# Patient Record
Sex: MALE | Race: WHITE | NOT HISPANIC OR LATINO | Employment: UNEMPLOYED | URBAN - METROPOLITAN AREA
[De-identification: names, ages, dates, MRNs, and addresses within clinical notes are randomized per-mention and may not be internally consistent; named-entity substitution may affect disease eponyms.]

---
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
## 2024-03-06 ENCOUNTER — OFFICE VISIT (OUTPATIENT)
Dept: FAMILY MEDICINE | Facility: CLINIC | Age: 19
End: 2024-03-06
Payer: MEDICAID

## 2024-03-06 VITALS
BODY MASS INDEX: 20.1 KG/M2 | WEIGHT: 140.38 LBS | HEIGHT: 70 IN | DIASTOLIC BLOOD PRESSURE: 81 MMHG | OXYGEN SATURATION: 100 % | TEMPERATURE: 98 F | HEART RATE: 61 BPM | SYSTOLIC BLOOD PRESSURE: 125 MMHG

## 2024-03-06 DIAGNOSIS — Z72.0 VAPES NICOTINE CONTAINING SUBSTANCE: ICD-10-CM

## 2024-03-06 DIAGNOSIS — B07.9 VIRAL WARTS, UNSPECIFIED TYPE: Primary | ICD-10-CM

## 2024-03-06 DIAGNOSIS — Z86.19 HX OF CHLAMYDIA INFECTION: ICD-10-CM

## 2024-03-06 PROCEDURE — 99213 OFFICE O/P EST LOW 20 MIN: CPT | Mod: PBBFAC | Performed by: STUDENT IN AN ORGANIZED HEALTH CARE EDUCATION/TRAINING PROGRAM

## 2024-03-06 NOTE — PROGRESS NOTES
"[facility]  Office Visit Note    Subjective   Roberto Hall  [id number]  03/07/2024    Chief Complaint: F/U WARTS    Roberto Hall is a 18 y.o. male  presenting to [facility] for routine F/u.    Warts  - 2 small lesions remaining on left hand  - pt requests topical given last visit  - interested in injections if does not resolve    Vaping  - quit for one week  - unable to quantify current use  - interested in quitting  - does not want to use patches, lozenges or gum    Social: sexually active with same female partner over last year.       - last STD testing 1/2023 negative    Review of Systems   Constitutional:  Negative for activity change, appetite change, fatigue and fever.   Cardiovascular:  Negative for chest pain.   Gastrointestinal:  Negative for abdominal pain.   Genitourinary:  Negative for dysuria, penile discharge and penile pain.   Musculoskeletal:  Negative for arthralgias.       Objective    Vitals:    03/06/24 1003   BP: 125/81   BP Location: Right arm   Patient Position: Sitting   BP Method: Medium (Automatic)   Pulse: 61   Temp: 97.9 °F (36.6 °C)   TempSrc: Oral   SpO2: 100%   Weight: 63.7 kg (140 lb 6.4 oz)   Height: 5' 10" (1.778 m)        Physical Exam  Constitutional:       General: He is not in acute distress.     Appearance: He is not ill-appearing.   Cardiovascular:      Rate and Rhythm: Normal rate and regular rhythm.   Pulmonary:      Effort: Pulmonary effort is normal.      Breath sounds: Normal breath sounds.   Abdominal:      General: Bowel sounds are normal. There is no distension.      Palpations: Abdomen is soft.      Tenderness: There is no abdominal tenderness.   Skin:     General: Skin is warm and dry.      Comments: 2 Rough, demarcated raised lesion on 3rd digit left hand   Neurological:      General: No focal deficit present.      Mental Status: He is oriented to person, place, and time.   Psychiatric:         Mood and Affect: Mood normal.         Behavior: Behavior " normal.         Thought Content: Thought content normal.         Judgment: Judgment normal.         Current Medications:   Current Outpatient Medications   Medication Sig Dispense Refill    mupirocin (BACTROBAN) 2 % ointment Apply topically 3 (three) times daily. 30 g 0    salicylic acid 40 % PtMd Apply 0.5 application  topically once daily. 36 each 0     No current facility-administered medications for this visit.       Assessment & Plan:  1. Viral warts, unspecified type    2. Vapes nicotine containing substance    3. Hx of chlamydia infection        Orders Placed This Encounter    salicylic acid 40 % PtMd       - Topical to wart lesions. Instructed to call if would like procedure clinic referral.   - Declines repeat STD testing today. Hand out given. Precautions discussed.   - Discussed benefits of smoking cessation. Plan for small goals to decrease use. Declines medication & cessation program.     RTC in 6 months      Delia Thomas M.D.  [facility]

## 2024-03-10 NOTE — PROGRESS NOTES
Faculty addendum: Patient discussed with resident on day of encounter.  Care provided reasonable and necessary. I participated in the management of the patient and was immediately available throughout the encounter. Services were furnished in a primary care center located in the outpatient department of a [facility]. I agree with the resident's findings and plan as documented in the resident's note.     Neda Wakefield, DO

## 2024-10-10 ENCOUNTER — HOSPITAL ENCOUNTER (EMERGENCY)
Facility: HOSPITAL | Age: 19
Discharge: HOME | End: 2024-10-11
Attending: EMERGENCY MEDICINE
Payer: MEDICAID

## 2024-10-10 VITALS
WEIGHT: 145 LBS | SYSTOLIC BLOOD PRESSURE: 131 MMHG | HEIGHT: 72 IN | TEMPERATURE: 98 F | RESPIRATION RATE: 20 BRPM | HEART RATE: 60 BPM | DIASTOLIC BLOOD PRESSURE: 90 MMHG | BODY MASS INDEX: 19.64 KG/M2 | OXYGEN SATURATION: 100 %

## 2024-10-10 DIAGNOSIS — R51.9 NONINTRACTABLE HEADACHE, UNSPECIFIED CHRONICITY PATTERN, UNSPECIFIED HEADACHE TYPE: Primary | ICD-10-CM

## 2024-10-10 PROCEDURE — 63600175 PHARM REV CODE 636 W HCPCS: Performed by: EMERGENCY MEDICINE

## 2024-10-10 PROCEDURE — 99284 EMERGENCY DEPT VISIT MOD MDM: CPT | Mod: 25

## 2024-10-10 PROCEDURE — 96372 THER/PROPH/DIAG INJ SC/IM: CPT | Performed by: EMERGENCY MEDICINE

## 2024-10-10 RX ORDER — KETOROLAC TROMETHAMINE 30 MG/ML
60 INJECTION, SOLUTION INTRAMUSCULAR; INTRAVENOUS
Status: COMPLETED | OUTPATIENT
Start: 2024-10-10 | End: 2024-10-10

## 2024-10-10 RX ADMIN — KETOROLAC TROMETHAMINE 60 MG: 30 INJECTION, SOLUTION INTRAMUSCULAR at 11:10

## 2024-10-11 RX ORDER — BUTALBITAL, ACETAMINOPHEN AND CAFFEINE 50; 325; 40 MG/1; MG/1; MG/1
1 TABLET ORAL EVERY 6 HOURS PRN
Qty: 20 TABLET | Refills: 0 | Status: SHIPPED | OUTPATIENT
Start: 2024-10-11 | End: 2024-11-10

## 2024-10-11 NOTE — ED PROVIDER NOTES
Encounter Date: 10/10/2024       History     Chief Complaint   Patient presents with    Headache     Headache since 1500-took acetaminophen 20 minutes ago-eye pressure-no visual problem     19-year-old male no past medical history presents with bilateral retro-orbital headache starting around 3:00 a.m. today.  Patient describes pain as pressure and gradually getting worse.  He has had headaches similar to this however today is more severe.  No cough, sore throat, runny nose, congestion, neck pain, fever or neuro symptoms.  No vision change    The history is provided by the patient.     Review of patient's allergies indicates:   Allergen Reactions    Amoxicillin      Past Medical History:   Diagnosis Date    Bilateral headaches 1/27/2023     No past surgical history on file.  Family History   Problem Relation Name Age of Onset    Hypertension Father       Social History     Tobacco Use    Smoking status: Every Day     Types: Vaping with nicotine    Smokeless tobacco: Never   Substance Use Topics    Alcohol use: Not Currently     Comment: occassionally    Drug use: Never     Review of Systems   Constitutional:  Negative for chills, diaphoresis, fatigue and fever.   HENT:  Negative for congestion, drooling, ear discharge, ear pain, postnasal drip, rhinorrhea, sinus pressure, sinus pain, sore throat and tinnitus.    Eyes:  Negative for discharge.   Respiratory:  Negative for cough, chest tightness, shortness of breath and wheezing.    Cardiovascular:  Negative for chest pain and palpitations.   Gastrointestinal:  Negative for abdominal pain, diarrhea, nausea and vomiting.   Genitourinary:  Negative for frequency, hematuria and urgency.   Musculoskeletal:  Negative for back pain, neck pain and neck stiffness.   Skin:  Negative for rash.   Neurological:  Positive for headaches. Negative for syncope, weakness, light-headedness and numbness.   Psychiatric/Behavioral:  Negative for suicidal ideas.        Physical Exam      Initial Vitals [10/10/24 2247]   BP Pulse Resp Temp SpO2   (!) 131/90 60 20 97.7 °F (36.5 °C) 100 %      MAP       --         Physical Exam    Nursing note and vitals reviewed.  Constitutional: He appears well-nourished. No distress.   HENT:   Head: Atraumatic.   Eyes: Conjunctivae and EOM are normal. Pupils are equal, round, and reactive to light.   Neck: Neck supple.   Normal range of motion.  Cardiovascular:  Normal rate.           Pulmonary/Chest: No respiratory distress.   Musculoskeletal:      Cervical back: Normal range of motion and neck supple.     Neurological: He is alert and oriented to person, place, and time. He has normal strength. No cranial nerve deficit or sensory deficit. GCS score is 15. GCS eye subscore is 4. GCS verbal subscore is 5. GCS motor subscore is 6.   Skin: Skin is warm and dry. No rash noted.         ED Course   Procedures  Labs Reviewed - No data to display       Imaging Results    None          Medications   ketorolac injection 60 mg (60 mg Intramuscular Given 10/10/24 2324)     Medical Decision Making  Medical Decision Making  Problem list/ differential diagnosis including but not limited to:  Migraine, tension, cluster, meningitis/encephalitis, ich/sah, hydrocephalus, mass, cva, carotid/ basilar artery dissection, cerebral venous thrombosis, sinusitis, cavernous sinus thrombosis,viral illness    Patient's chronic illnesses impacting care:  none    Diagnostic test considered but not ordered:    My interpretations:      Radiology reports      Discussion of case with external qualified healthcare professionals:  Not applicable    Review of external notes( inpt, ems, NH, clinic):      Decision rules/scores:    Medications reviewed:  Tylenol  Medications ordered in the ER:  Toradol  Discharge prescriptions: fioricet    Social variables possible impacting patient's healthcare:    Code status/discussion    Shared decision making:    Consideration for admission versus discharge:  stable for discharge     Risk  Prescription drug management.               ED Course as of 10/11/24 0006   Fri Oct 11, 2024   0004 HEADACHE FEELS BETTER [WC]      ED Course User Index  [WC] Israel You MD                           Clinical Impression:  Final diagnoses:  [R51.9] Nonintractable headache, unspecified chronicity pattern, unspecified headache type (Primary)          ED Disposition Condition    Discharge Stable          ED Prescriptions       Medication Sig Dispense Start Date End Date Auth. Provider    butalbital-acetaminophen-caffeine -40 mg (FIORICET, ESGIC) -40 mg per tablet Take 1 tablet by mouth every 6 (six) hours as needed. 20 tablet 10/11/2024 11/10/2024 Israel You MD          Follow-up Information       Follow up With Specialties Details Why Contact Info    [facility] - Emergency Dept Emergency Medicine  If symptoms worsen [id number]  [facility] [address]  [phone]             Israel You MD  10/11/24 0006

## 2024-10-16 ENCOUNTER — OFFICE VISIT (OUTPATIENT)
Dept: FAMILY MEDICINE | Facility: CLINIC | Age: 19
End: 2024-10-16
Payer: MEDICAID

## 2024-10-16 VITALS
SYSTOLIC BLOOD PRESSURE: 126 MMHG | OXYGEN SATURATION: 99 % | TEMPERATURE: 98 F | HEART RATE: 80 BPM | DIASTOLIC BLOOD PRESSURE: 85 MMHG | HEIGHT: 72 IN | WEIGHT: 141.81 LBS | BODY MASS INDEX: 19.21 KG/M2

## 2024-10-16 DIAGNOSIS — L23.7 POISON IVY DERMATITIS: Primary | ICD-10-CM

## 2024-10-16 PROCEDURE — 99213 OFFICE O/P EST LOW 20 MIN: CPT | Mod: PBBFAC

## 2024-10-16 RX ORDER — TRIAMCINOLONE ACETONIDE 5 MG/G
CREAM TOPICAL 2 TIMES DAILY
Qty: 15 G | Refills: 0 | Status: SHIPPED | OUTPATIENT
Start: 2024-10-16 | End: 2024-10-30

## 2024-10-16 RX ORDER — HYDROXYZINE HYDROCHLORIDE 25 MG/1
25 TABLET, FILM COATED ORAL 3 TIMES DAILY
Qty: 42 TABLET | Refills: 0 | Status: SHIPPED | OUTPATIENT
Start: 2024-10-16 | End: 2024-10-30

## 2024-10-16 NOTE — PROGRESS NOTES
Roberto Hall  10/16/2024  [id number]    Giorgi Dong MD  Patient Care Team:  Giorgi Dong MD as PCP - General (Family Medicine)    Visit Type: Acute Visit    Chief Complaint:  Chief Complaint   Patient presents with    Poison Ivy       History of Present Illness:  Roberto Hall presents to the clinic due to a rash on bilateral forearms with insect bites. He was working out in the woods clearing some tree branches and was exposed to poison ivy 2 days ago. He has since been extremely itchy to his arms and has been applying calamine lotion with a little bit of relief. He has not been taking any oral medication at this time. He has no other concerns or complaints today.      History:  Past Medical History:   Diagnosis Date    Bilateral headaches 1/27/2023     No past surgical history on file.  Family History   Problem Relation Name Age of Onset    Hypertension Father       Social History     Socioeconomic History    Marital status: Single   Tobacco Use    Smoking status: Every Day     Types: Vaping with nicotine    Smokeless tobacco: Never   Substance and Sexual Activity    Alcohol use: Not Currently     Comment: occassionally    Drug use: Never    Sexual activity: Yes     Patient Active Problem List   Diagnosis    Vapes nicotine containing substance    Hx of chlamydia infection    Hyperbilirubinemia    Bilateral headaches     Review of patient's allergies indicates:   Allergen Reactions    Amoxicillin        The following were reviewed at this visit: active problem list, medication list, allergies, family history, social history, and health maintenance.    Medications:  Current Outpatient Medications on File Prior to Visit   Medication Sig Dispense Refill    butalbital-acetaminophen-caffeine -40 mg (FIORICET, ESGIC) -40 mg per tablet Take 1 tablet by mouth every 6 (six) hours as needed. 20 tablet 0    mupirocin (BACTROBAN) 2 % ointment Apply topically 3 (three) times daily. 30 g 0    salicylic acid 40 % PtMd  Apply 0.5 application  topically once daily. 36 each 0     No current facility-administered medications on file prior to visit.       Medications have been reviewed and reconciled with patient at this visit.  Barriers to medications reviewed with patient.  Adverse reactions to current medications reviewed with patient.  Over the counter medications reviewed and reconciled with patient.    Exam:  /85 (BP Location: Right arm, Patient Position: Sitting)   Pulse 80   Temp 98.2 °F (36.8 °C) (Oral)   Ht 6' (1.829 m)   Wt 64.3 kg (141 lb 12.8 oz)   SpO2 99%   BMI 19.23 kg/m²     Physical Exam:  Constitutional: AOx3, Normal appearance.   HENT: Normocephalic and atraumatic, EMOI   MSK: Normal ROM  Skin: warm and dry. Pruritic rash with a few pinpoint vesicles to the bilateral forearms   Neuro: No focal deficit present.   Psych: Mood and behavior normal. '      Assessment and Plan:  Roberto was seen today for poison ivy.  Diagnoses and all orders for this visit:    Poison ivy dermatitis  -Discussed with patient if exposed to poison ivy in the future that he should take a shower immediately using christy soap and to clean any clothing that may have been exposed as well. The rash will not spread after a shower but areas that initially cam in contact with the plant will continue to surface over the first 48hrs. Although there may be new areas of the rash it is not spreading, it is the initial exposed area reacting.   -Will begin the patient on triamcinolone and hydroxyzine for 2 weeks. Discussed that his skin may dry out with the triamcinolone and he should use an emollient like Aquaphor to the area.  -triamcinolone acetonide 0.5% (KENALOG) 0.5 % Crea; Apply topically 2 (two) times daily. for 14 days  -hydrOXYzine HCL (ATARAX) 25 MG tablet; Take 1 tablet (25 mg total) by mouth 3 (three) times daily. for 14 days    Follow up: PRN    After visit summary was printed and given to patient upon discharge today.  Patient goals  and care plan are included in After Visit Summary.        This note is dictated using the M*Modal Fluency Direct word recognition program. There are word recognition mistakes that are occasionally missed on review.     Emilee De La Cruz MD  Family Medicine

## 2024-10-17 ENCOUNTER — OFFICE VISIT (OUTPATIENT)
Dept: URGENT CARE | Facility: CLINIC | Age: 19
End: 2024-10-17
Payer: MEDICAID

## 2024-10-17 VITALS
WEIGHT: 143.81 LBS | TEMPERATURE: 98 F | OXYGEN SATURATION: 100 % | SYSTOLIC BLOOD PRESSURE: 122 MMHG | BODY MASS INDEX: 19.48 KG/M2 | HEIGHT: 72 IN | RESPIRATION RATE: 18 BRPM | HEART RATE: 60 BPM | DIASTOLIC BLOOD PRESSURE: 82 MMHG

## 2024-10-17 DIAGNOSIS — L30.9 DERMATITIS: Primary | ICD-10-CM

## 2024-10-17 PROCEDURE — 99213 OFFICE O/P EST LOW 20 MIN: CPT | Mod: S$PBB,,, | Performed by: NURSE PRACTITIONER

## 2024-10-17 PROCEDURE — 99214 OFFICE O/P EST MOD 30 MIN: CPT | Mod: PBBFAC | Performed by: NURSE PRACTITIONER

## 2024-10-17 RX ORDER — PREDNISONE 20 MG/1
20 TABLET ORAL 2 TIMES DAILY
Qty: 10 TABLET | Refills: 0 | Status: SHIPPED | OUTPATIENT
Start: 2024-10-17 | End: 2024-10-22

## 2024-10-17 NOTE — PROGRESS NOTES
Faculty Attestation: Roberto Hall  was seen in Family Medicine Clinic. Discussed with Dr. De La Cruz at the time of the visit. History of Present Illness, Physical Exam, and Assessment and Plan reviewed. Treatment plan is reasonable and appropriate. Compliance with treatment recommendations is important.       Hilda Rodríguez MD  Family Medicine

## 2024-10-17 NOTE — PATIENT INSTRUCTIONS
Please follow instructions on patient education material.      Return to urgent care in 2 to 3 days if symptoms are not improving, immediately if you develop any new or worsening symptoms.     See patient education for further guidance.  Continue calamine  Take over-the-counter antihistamine of your choice such as Claritin Zyrtec or Xyzal  Do not scratch your hands with your fingernails, they can become infected.  Start Rx meds today.  Drink plenty of fluids dehydrated  RTC for any worsening.  Advised no hydrogen peroxide, alcohol, or kitchen items on the rash.     Go to the ER if you experience chest pain with shortness of breath, shortness of breath when moving around your house, high fevers 103.0+, excessive vomiting/diarrhea, or general distress.

## 2024-10-17 NOTE — PROGRESS NOTES
Subjective:      Patient ID: Roberto Hall is a 19 y.o. male.    Vitals:  height is 6' (1.829 m) and weight is 65.2 kg (143 lb 12.8 oz). His oral temperature is 98.1 °F (36.7 °C). His blood pressure is 122/82 and his pulse is 60. His respiration is 18 and oxygen saturation is 100%.     Chief Complaint: Poison Ivy (Patient reports poison ivy all over body x 3 days Pt was prescribed Atarax and Kenalog cream at PCP yesterday but states symptoms haven't gotten better and woke up this morning with tightness in face )    Poison Idania     As stated in CC. Pt reports he took hydroxyzine as used for itching in poison ivy dermatitis.  Patient states he woke up drowsy and feeling tired as described tightness in his face. Pt reports using cream last night as well. PT reports calamine lotion works better. Patient denies headache, dizziness, weakness, trouble breathing, shortness for breath sore throat, trouble swallowing, chest pain, stomach pain, nausea or vomiting.  ROS   Objective:     Physical Exam   Constitutional: He is oriented to person, place, and time. He appears well-developed.  Non-toxic appearance. He does not appear ill. No distress.   HENT:   Head: Atraumatic.   Nose: No purulent discharge. Right sinus exhibits no maxillary sinus tenderness and no frontal sinus tenderness. Left sinus exhibits no maxillary sinus tenderness and no frontal sinus tenderness.   Mouth/Throat: Uvula is midline.   Eyes: Right eye exhibits no discharge. Left eye exhibits no discharge. Extraocular movement intact   Neck: Neck supple. No neck rigidity present.   Cardiovascular: Regular rhythm.   Pulmonary/Chest: Effort normal and breath sounds normal. No respiratory distress. He has no wheezes. He has no rhonchi. He has no rales.   Musculoskeletal: Normal range of motion.         General: Normal range of motion.   Lymphadenopathy:     He has no cervical adenopathy.   Neurological: He is alert and oriented to person, place, and time.   Skin: Skin  is warm, dry, not diaphoretic and rash. Capillary refill takes less than 2 seconds.         Comments: Rash to generalized body consistent with allergic dermatitis   Psychiatric: His behavior is normal. Mood, judgment and thought content normal.   Nursing note and vitals reviewed.      Assessment:     1. Dermatitis        Plan:   Prescription management with steroids prednisone 20 mg p.o. b.i.d. for 5 days to help with inflammation and rash  Continue calamine  Take over-the-counter antihistamine of your choice such as Claritin Zyrtec or Xyzal  Do not scratch your hands with your fingernails, they can become infected.  Start Rx meds today.  Drink plenty of fluids dehydrated  RTC for any worsening.  Advised no hydrogen peroxide, alcohol, or kitchen items on the anthony  Dermatitis    Other orders  -     predniSONE (DELTASONE) 20 MG tablet; Take 1 tablet (20 mg total) by mouth 2 (two) times daily. for 5 days  Dispense: 10 tablet; Refill: 0

## 2024-10-17 NOTE — LETTER
October 17, 2024      [facility]  [facility] [facility] [address]  Phone: [phone]       Patient: Roberto Hall   YOB: 2005  Date of Visit: 10/17/2024    To Whom It May Concern:    Tawana Hall  was at [facility] on 10/17/2024. The patient may return to work/school on 10/19/2024 with no restrictions. If you have any questions or concerns, or if I can be of further assistance, please do not hesitate to contact me.    Sincerely,    JJ Cabrera

## 2024-10-28 ENCOUNTER — ON-DEMAND VIRTUAL (OUTPATIENT)
Dept: URGENT CARE | Facility: CLINIC | Age: 19
End: 2024-10-28
Payer: MEDICAID

## 2024-10-28 DIAGNOSIS — R30.0 DYSURIA: Primary | ICD-10-CM

## 2024-10-28 DIAGNOSIS — R35.0 MICTURITION FREQUENCY: ICD-10-CM

## 2024-10-28 PROCEDURE — 99284 EMERGENCY DEPT VISIT MOD MDM: CPT

## 2024-10-28 PROCEDURE — 99212 OFFICE O/P EST SF 10 MIN: CPT | Mod: 95,,, | Performed by: FAMILY MEDICINE

## 2024-10-28 RX ORDER — CIPROFLOXACIN 500 MG/1
500 TABLET ORAL EVERY 12 HOURS
Qty: 14 TABLET | Refills: 0 | Status: SHIPPED | OUTPATIENT
Start: 2024-10-28 | End: 2024-11-04

## 2024-10-28 RX ORDER — PHENAZOPYRIDINE HYDROCHLORIDE 200 MG/1
200 TABLET, FILM COATED ORAL 3 TIMES DAILY PRN
Qty: 6 TABLET | Refills: 0 | Status: SHIPPED | OUTPATIENT
Start: 2024-10-28 | End: 2024-10-30

## 2024-10-29 ENCOUNTER — ON-DEMAND VIRTUAL (OUTPATIENT)
Dept: URGENT CARE | Facility: CLINIC | Age: 19
End: 2024-10-29
Payer: MEDICAID

## 2024-10-29 ENCOUNTER — HOSPITAL ENCOUNTER (EMERGENCY)
Facility: HOSPITAL | Age: 19
Discharge: HOME | End: 2024-10-29
Attending: EMERGENCY MEDICINE
Payer: MEDICAID

## 2024-10-29 ENCOUNTER — NURSE TRIAGE (OUTPATIENT)
Dept: ADMINISTRATIVE | Facility: CLINIC | Age: 19
End: 2024-10-29
Payer: MEDICAID

## 2024-10-29 VITALS
HEART RATE: 63 BPM | RESPIRATION RATE: 16 BRPM | BODY MASS INDEX: 19.98 KG/M2 | OXYGEN SATURATION: 100 % | WEIGHT: 142.75 LBS | SYSTOLIC BLOOD PRESSURE: 132 MMHG | TEMPERATURE: 98 F | DIASTOLIC BLOOD PRESSURE: 78 MMHG | HEIGHT: 71 IN

## 2024-10-29 DIAGNOSIS — Z20.2 STD EXPOSURE: Primary | ICD-10-CM

## 2024-10-29 DIAGNOSIS — N30.00 ACUTE CYSTITIS WITHOUT HEMATURIA: ICD-10-CM

## 2024-10-29 DIAGNOSIS — K59.00 CONSTIPATION, UNSPECIFIED CONSTIPATION TYPE: Primary | ICD-10-CM

## 2024-10-29 DIAGNOSIS — R10.9 ABDOMINAL PAIN: ICD-10-CM

## 2024-10-29 LAB
ALBUMIN SERPL-MCNC: 4.6 G/DL (ref 3.5–5)
ALBUMIN/GLOB SERPL: 1.4 RATIO (ref 1.1–2)
ALP SERPL-CCNC: 64 UNIT/L (ref 40–150)
ALT SERPL-CCNC: 12 UNIT/L (ref 0–55)
ANION GAP SERPL CALC-SCNC: 11 MEQ/L
AST SERPL-CCNC: 12 UNIT/L (ref 5–34)
BACTERIA #/AREA URNS AUTO: ABNORMAL /HPF
BASOPHILS # BLD AUTO: 0.05 X10(3)/MCL
BASOPHILS NFR BLD AUTO: 0.7 %
BILIRUB SERPL-MCNC: 1.1 MG/DL
BILIRUB UR QL STRIP.AUTO: NEGATIVE
BUN SERPL-MCNC: 12.7 MG/DL (ref 8.9–20.6)
CALCIUM SERPL-MCNC: 9.7 MG/DL (ref 8.4–10.2)
CHLORIDE SERPL-SCNC: 102 MMOL/L (ref 98–107)
CLARITY UR: CLEAR
CO2 SERPL-SCNC: 27 MMOL/L (ref 22–29)
COLOR UR AUTO: ABNORMAL
CREAT SERPL-MCNC: 0.88 MG/DL (ref 0.72–1.25)
CREAT/UREA NIT SERPL: 14
EOSINOPHIL # BLD AUTO: 0.16 X10(3)/MCL (ref 0–0.9)
EOSINOPHIL NFR BLD AUTO: 2.2 %
ERYTHROCYTE [DISTWIDTH] IN BLOOD BY AUTOMATED COUNT: 12.4 % (ref 11.5–17)
GFR SERPLBLD CREATININE-BSD FMLA CKD-EPI: >60 ML/MIN/1.73/M2
GLOBULIN SER-MCNC: 3.3 GM/DL (ref 2.4–3.5)
GLUCOSE SERPL-MCNC: 105 MG/DL (ref 74–100)
GLUCOSE UR QL STRIP: NORMAL
HCT VFR BLD AUTO: 47.2 % (ref 42–52)
HGB BLD-MCNC: 16.3 G/DL (ref 14–18)
HGB UR QL STRIP: NEGATIVE
HYALINE CASTS #/AREA URNS LPF: ABNORMAL /LPF
IMM GRANULOCYTES # BLD AUTO: 0.02 X10(3)/MCL (ref 0–0.04)
IMM GRANULOCYTES NFR BLD AUTO: 0.3 %
KETONES UR QL STRIP: NEGATIVE
LEUKOCYTE ESTERASE UR QL STRIP: 25
LIPASE SERPL-CCNC: 21 U/L
LYMPHOCYTES # BLD AUTO: 2.61 X10(3)/MCL (ref 0.6–4.6)
LYMPHOCYTES NFR BLD AUTO: 36.2 %
MCH RBC QN AUTO: 29.9 PG (ref 27–31)
MCHC RBC AUTO-ENTMCNC: 34.5 G/DL (ref 33–36)
MCV RBC AUTO: 86.6 FL (ref 80–94)
MONOCYTES # BLD AUTO: 0.68 X10(3)/MCL (ref 0.1–1.3)
MONOCYTES NFR BLD AUTO: 9.4 %
NEUTROPHILS # BLD AUTO: 3.69 X10(3)/MCL (ref 2.1–9.2)
NEUTROPHILS NFR BLD AUTO: 51.2 %
NITRITE UR QL STRIP: NEGATIVE
NRBC BLD AUTO-RTO: 0 %
PH UR STRIP: 6.5 [PH]
PLATELET # BLD AUTO: 216 X10(3)/MCL (ref 130–400)
PMV BLD AUTO: 9.7 FL (ref 7.4–10.4)
POTASSIUM SERPL-SCNC: 3.8 MMOL/L (ref 3.5–5.1)
PROT SERPL-MCNC: 7.9 GM/DL (ref 6.4–8.3)
PROT UR QL STRIP: NEGATIVE
RBC # BLD AUTO: 5.45 X10(6)/MCL (ref 4.7–6.1)
RBC #/AREA URNS AUTO: ABNORMAL /HPF
SODIUM SERPL-SCNC: 140 MMOL/L (ref 136–145)
SP GR UR STRIP.AUTO: <1.005 (ref 1–1.03)
SQUAMOUS #/AREA URNS LPF: ABNORMAL /HPF
UROBILINOGEN UR STRIP-ACNC: NORMAL
WBC # BLD AUTO: 7.21 X10(3)/MCL (ref 4.5–11.5)
WBC #/AREA URNS AUTO: ABNORMAL /HPF

## 2024-10-29 PROCEDURE — 83690 ASSAY OF LIPASE: CPT | Performed by: NURSE PRACTITIONER

## 2024-10-29 PROCEDURE — 85025 COMPLETE CBC W/AUTO DIFF WBC: CPT | Performed by: NURSE PRACTITIONER

## 2024-10-29 PROCEDURE — 81001 URINALYSIS AUTO W/SCOPE: CPT | Performed by: NURSE PRACTITIONER

## 2024-10-29 PROCEDURE — 87086 URINE CULTURE/COLONY COUNT: CPT | Performed by: NURSE PRACTITIONER

## 2024-10-29 PROCEDURE — 80053 COMPREHEN METABOLIC PANEL: CPT | Performed by: NURSE PRACTITIONER

## 2024-10-29 RX ORDER — POLYETHYLENE GLYCOL 3350 17 G/17G
17 POWDER, FOR SOLUTION ORAL DAILY PRN
Qty: 10 EACH | Refills: 0 | Status: SHIPPED | OUTPATIENT
Start: 2024-10-29

## 2024-10-29 RX ORDER — DOXYCYCLINE 100 MG/1
100 CAPSULE ORAL 2 TIMES DAILY
Qty: 14 CAPSULE | Refills: 0 | Status: SHIPPED | OUTPATIENT
Start: 2024-10-29 | End: 2024-11-05

## 2024-10-31 LAB — BACTERIA UR CULT: NO GROWTH

## 2024-11-15 ENCOUNTER — OFFICE VISIT (OUTPATIENT)
Dept: FAMILY MEDICINE | Facility: CLINIC | Age: 19
End: 2024-11-15
Payer: MEDICAID

## 2024-11-15 VITALS
OXYGEN SATURATION: 100 % | TEMPERATURE: 98 F | HEART RATE: 82 BPM | DIASTOLIC BLOOD PRESSURE: 78 MMHG | SYSTOLIC BLOOD PRESSURE: 121 MMHG | WEIGHT: 139.63 LBS | BODY MASS INDEX: 19.55 KG/M2 | HEIGHT: 71 IN

## 2024-11-15 DIAGNOSIS — Z71.1 CONCERN ABOUT STD IN MALE WITHOUT DIAGNOSIS: Primary | ICD-10-CM

## 2024-11-15 LAB
BACTERIA #/AREA URNS AUTO: ABNORMAL /HPF
BILIRUB UR QL STRIP.AUTO: NEGATIVE
C TRACH DNA SPEC QL NAA+PROBE: NOT DETECTED
CLARITY UR: CLEAR
COLOR UR AUTO: COLORLESS
GLUCOSE UR QL STRIP: NORMAL
HGB UR QL STRIP: NEGATIVE
HYALINE CASTS #/AREA URNS LPF: ABNORMAL /LPF
KETONES UR QL STRIP: NEGATIVE
LEUKOCYTE ESTERASE UR QL STRIP: NEGATIVE
N GONORRHOEA DNA SPEC QL NAA+PROBE: NOT DETECTED
NITRITE UR QL STRIP: NEGATIVE
PH UR STRIP: 7 [PH]
PROT UR QL STRIP: NEGATIVE
RBC #/AREA URNS AUTO: ABNORMAL /HPF
SOURCE (OHS): NORMAL
SP GR UR STRIP.AUTO: 1.01 (ref 1–1.03)
SQUAMOUS #/AREA URNS LPF: ABNORMAL /HPF
UROBILINOGEN UR STRIP-ACNC: NORMAL
WBC #/AREA URNS AUTO: ABNORMAL /HPF

## 2024-11-15 PROCEDURE — 81001 URINALYSIS AUTO W/SCOPE: CPT

## 2024-11-15 PROCEDURE — 87591 N.GONORRHOEAE DNA AMP PROB: CPT

## 2024-11-15 PROCEDURE — 99213 OFFICE O/P EST LOW 20 MIN: CPT | Mod: PBBFAC

## 2024-11-15 RX ORDER — DOXYCYCLINE 100 MG/1
100 CAPSULE ORAL 2 TIMES DAILY
Qty: 14 CAPSULE | Refills: 0 | Status: SHIPPED | OUTPATIENT
Start: 2024-11-15 | End: 2024-11-22

## 2024-11-15 NOTE — PROGRESS NOTES
"[facility] OFFICE VISIT NOTE  Roberto Hall  [id number]  11/15/2024    Chief Complaint   Patient presents with    STD TESTING       Roberto Hall is a 19 y.o. male  presenting to [facility] for STD testing.    Treated for chlamydia approx 2 weeks ago, completed treatment on Tuesday 11/5/2024 with  7 day course of doxycycline. States had intercourse on that day and that partner then tested positive for chlamydia. Currently still having AM discharge. Denies any burning/pain with urination.    (As documented by patient prior to office visit)  Review of Systems   Constitutional:  Negative for activity change and unexpected weight change.   HENT:  Negative for hearing loss, rhinorrhea and trouble swallowing.    Eyes:  Negative for discharge and visual disturbance.   Respiratory:  Negative for chest tightness and wheezing.    Cardiovascular:  Negative for chest pain and palpitations.   Gastrointestinal:  Negative for blood in stool, constipation, diarrhea and vomiting.   Endocrine: Negative for polydipsia and polyuria.   Genitourinary:  Positive for penile discharge. Negative for difficulty urinating, hematuria, penile pain and urgency.   Musculoskeletal:  Negative for arthralgias, joint swelling and neck pain.   Neurological:  Negative for weakness and headaches.   Psychiatric/Behavioral:  Negative for confusion and dysphoric mood.        Blood pressure 121/78, pulse 82, temperature 98.1 °F (36.7 °C), temperature source Oral, height 5' 11" (1.803 m), weight 63.3 kg (139 lb 9.6 oz), SpO2 100%.   Physical Exam  Vitals and nursing note reviewed.   Constitutional:       General: He is not in acute distress.     Appearance: He is not ill-appearing, toxic-appearing or diaphoretic.   Eyes:      Conjunctiva/sclera: Conjunctivae normal.   Cardiovascular:      Rate and Rhythm: Normal rate and regular rhythm.      Heart sounds: Normal heart sounds. Heart sounds not distant. No murmur heard.     No friction rub. No gallop.   Pulmonary:      " Effort: Pulmonary effort is normal. No respiratory distress.      Breath sounds: Normal breath sounds. No decreased breath sounds or wheezing.   Abdominal:      General: Abdomen is flat. Bowel sounds are normal. There is no distension.      Palpations: Abdomen is soft.      Tenderness: There is no abdominal tenderness. There is no guarding.   Genitourinary:     Comments: Deferred  Skin:     General: Skin is warm.      Capillary Refill: Capillary refill takes less than 2 seconds.   Neurological:      Mental Status: He is alert.         Current Medications:   Current Outpatient Medications   Medication Instructions    doxycycline (VIBRAMYCIN) 100 mg, Oral, 2 times daily    polyethylene glycol (GLYCOLAX) 17 g, Oral, Daily PRN    salicylic acid 40 % PtMd 0.5 application , Topical (Top), Daily       Assessment:   1. Concern about STD in male without diagnosis        Plan:  STD  - Patient previously tested positive for chlamydia, treated, and then had unprotected intercourse  - UA and urine GC/chlamydia  - Offered  exam, patient defers at this time  - Offered HIV, RPR, Hep testing; patient does not desire at this time but will call and schedule visit if he decides he wants testing      Orders Placed This Encounter    Chlamydia/GC, PCR    Urinalysis, Reflex to Urine Culture    doxycycline (VIBRAMYCIN) 100 MG Cap       Return to clinic in 3 months for DELVIN for chlamydia, or sooner if needed.     Jose Alfredo Red MD  [facility]

## 2024-11-15 NOTE — ADDENDUM NOTE
Addended by: OLGA MCPHERSON on: 11/15/2024 10:01 AM     Modules accepted: Level of Service     Known

## 2024-11-18 ENCOUNTER — OFFICE VISIT (OUTPATIENT)
Dept: FAMILY MEDICINE | Facility: CLINIC | Age: 19
End: 2024-11-18
Payer: MEDICAID

## 2024-11-18 VITALS
DIASTOLIC BLOOD PRESSURE: 73 MMHG | TEMPERATURE: 98 F | BODY MASS INDEX: 19.46 KG/M2 | RESPIRATION RATE: 20 BRPM | OXYGEN SATURATION: 99 % | SYSTOLIC BLOOD PRESSURE: 118 MMHG | HEART RATE: 68 BPM | WEIGHT: 139 LBS | HEIGHT: 71 IN

## 2024-11-18 DIAGNOSIS — Z11.3 SCREENING EXAMINATION FOR STD (SEXUALLY TRANSMITTED DISEASE): Primary | ICD-10-CM

## 2024-11-18 LAB
HAV IGM SERPL QL IA: NONREACTIVE
HBV CORE IGM SERPL QL IA: NONREACTIVE
HBV SURFACE AG SERPL QL IA: NONREACTIVE
HCV AB SERPL QL IA: NONREACTIVE
HIV 1+2 AB+HIV1 P24 AG SERPL QL IA: NONREACTIVE
T PALLIDUM AB SER QL: NONREACTIVE

## 2024-11-18 PROCEDURE — 36415 COLL VENOUS BLD VENIPUNCTURE: CPT

## 2024-11-18 PROCEDURE — 87389 HIV-1 AG W/HIV-1&-2 AB AG IA: CPT

## 2024-11-18 PROCEDURE — 80074 ACUTE HEPATITIS PANEL: CPT

## 2024-11-18 PROCEDURE — 86780 TREPONEMA PALLIDUM: CPT

## 2024-11-18 PROCEDURE — 99213 OFFICE O/P EST LOW 20 MIN: CPT | Mod: PBBFAC

## 2024-11-18 NOTE — PROGRESS NOTES
"[facility] Clinic Note    CHIEF COMPLAINT:  Chief Complaint   Patient presents with    Exposure to STD     Patient wants to be tested further    Penile Discharge       HISTORY OF  PRESENT ILLNESS:  Roberto Hall is a 19 y.o. male who presents to clinic today for STD screening.    Last seen on 11/14/2024.  Urine GC/chlamydia and urinalysis done at that visit were unremarkable. STD blood work screening was offered to him , however he declined.  He is here today to get blood work done.  Patient reports minimal clear penile discharge, and states that this has improved from when symptoms initially appeared 2 weeks ago.  He denies any fever, chills, burning with urination, increased urinary frequency or urgency.        Current Outpatient Medications   Medication Instructions    doxycycline (VIBRAMYCIN) 100 mg, Oral, 2 times daily    polyethylene glycol (GLYCOLAX) 17 g, Oral, Daily PRN    salicylic acid 40 % PtMd 0.5 application , Topical (Top), Daily         PHYSICAL EXAMINATION:  Vitals:    11/18/24 1543   BP: 118/73   BP Location: Right arm   Patient Position: Sitting   Pulse: 68   Resp: 20   Temp: 98 °F (36.7 °C)   TempSrc: Oral   SpO2: 99%   Weight: 63 kg (139 lb)   Height: 5' 11" (1.803 m)       General:  VSS. No acute distress.   Respiratory: clear to ascultation in all lung fields  Cardiovascular:  RRR, no additional heart sounds heard.  : Deferred  Musculoskeletal:  Full range of motion of all extremities  Neurologic:  A&O X 3.         ASSESSMENT/PLAN:    1) Encounter for STD screening  HIV, RPR and hepatitis panel ordered today. Will follow up results.      Keep current scheduled appointment with PCP.      Sybil Hamilton M.D  [facility] Family Medicine Resident, [facility]       "

## 2024-11-19 ENCOUNTER — TELEPHONE (OUTPATIENT)
Dept: FAMILY MEDICINE | Facility: CLINIC | Age: 19
End: 2024-11-19
Payer: MEDICAID

## 2024-11-19 DIAGNOSIS — R30.0 DYSURIA: Primary | ICD-10-CM

## 2024-11-19 NOTE — PROGRESS NOTES
I have discussed the case with the resident and reviewed the resident's history and physical, assessment, plan, and progress note. I agree with the findings.       Isiah De Leon MD  [facility]

## 2024-11-19 NOTE — TELEPHONE ENCOUNTER
Contacted patient to notify of negative lab results. He voiced understanding.      Sybil Hamilton MD  [facility] Family Medicine Resident, [facility]

## 2024-11-21 NOTE — TELEPHONE ENCOUNTER
Patient called clinic requesting a call back. He reports having slight burning with urination that began this morning. He denies any penile discharge. STD screening done at last visits were all negative. Will order a U/A with reflex to culture.        Sybil Hamilton MD  [facility] Family Medicine Resident, [facility]

## 2024-11-25 ENCOUNTER — LAB VISIT (OUTPATIENT)
Dept: LAB | Facility: HOSPITAL | Age: 19
End: 2024-11-25
Payer: MEDICAID

## 2024-11-25 DIAGNOSIS — R30.0 DYSURIA: ICD-10-CM

## 2024-11-25 LAB
BACTERIA #/AREA URNS AUTO: ABNORMAL /HPF
BILIRUB UR QL STRIP.AUTO: NEGATIVE
CLARITY UR: CLEAR
COLOR UR AUTO: ABNORMAL
GLUCOSE UR QL STRIP: NORMAL
HGB UR QL STRIP: NEGATIVE
HYALINE CASTS #/AREA URNS LPF: ABNORMAL /LPF
KETONES UR QL STRIP: NEGATIVE
LEUKOCYTE ESTERASE UR QL STRIP: NEGATIVE
MUCOUS THREADS URNS QL MICRO: ABNORMAL /LPF
NITRITE UR QL STRIP: NEGATIVE
PH UR STRIP: 6.5 [PH]
PROT UR QL STRIP: NEGATIVE
RBC #/AREA URNS AUTO: ABNORMAL /HPF
SP GR UR STRIP.AUTO: 1.02 (ref 1–1.03)
SQUAMOUS #/AREA URNS LPF: ABNORMAL /HPF
UROBILINOGEN UR STRIP-ACNC: ABNORMAL
WBC #/AREA URNS AUTO: ABNORMAL /HPF

## 2024-11-25 PROCEDURE — 81001 URINALYSIS AUTO W/SCOPE: CPT

## 2024-11-26 ENCOUNTER — PATIENT MESSAGE (OUTPATIENT)
Dept: FAMILY MEDICINE | Facility: CLINIC | Age: 19
End: 2024-11-26
Payer: MEDICAID

## 2024-11-27 ENCOUNTER — OFFICE VISIT (OUTPATIENT)
Dept: FAMILY MEDICINE | Facility: CLINIC | Age: 19
End: 2024-11-27
Payer: MEDICAID

## 2024-11-27 VITALS
HEIGHT: 71 IN | OXYGEN SATURATION: 100 % | DIASTOLIC BLOOD PRESSURE: 87 MMHG | WEIGHT: 143 LBS | TEMPERATURE: 98 F | HEART RATE: 78 BPM | BODY MASS INDEX: 20.02 KG/M2 | SYSTOLIC BLOOD PRESSURE: 130 MMHG

## 2024-11-27 DIAGNOSIS — R30.0 DYSURIA: Primary | ICD-10-CM

## 2024-11-27 LAB
BACTERIA #/AREA URNS AUTO: NORMAL /HPF
BILIRUB UR QL STRIP.AUTO: NEGATIVE
C TRACH DNA SPEC QL NAA+PROBE: DETECTED
CLARITY UR: CLEAR
COLOR UR AUTO: NORMAL
GLUCOSE UR QL STRIP: NORMAL
HGB UR QL STRIP: NEGATIVE
HYALINE CASTS #/AREA URNS LPF: NORMAL /LPF
KETONES UR QL STRIP: NEGATIVE
LEUKOCYTE ESTERASE UR QL STRIP: NEGATIVE
N GONORRHOEA DNA SPEC QL NAA+PROBE: NOT DETECTED
NITRITE UR QL STRIP: NEGATIVE
PH UR STRIP: 6.5 [PH]
PROT UR QL STRIP: NEGATIVE
RBC #/AREA URNS AUTO: NORMAL /HPF
SOURCE (OHS): ABNORMAL
SP GR UR STRIP.AUTO: 1.02 (ref 1–1.03)
SQUAMOUS #/AREA URNS LPF: NORMAL /HPF
UROBILINOGEN UR STRIP-ACNC: NORMAL
WBC #/AREA URNS AUTO: NORMAL /HPF

## 2024-11-27 PROCEDURE — 81001 URINALYSIS AUTO W/SCOPE: CPT

## 2024-11-27 PROCEDURE — 99213 OFFICE O/P EST LOW 20 MIN: CPT | Mod: PBBFAC

## 2024-11-27 PROCEDURE — 96372 THER/PROPH/DIAG INJ SC/IM: CPT | Mod: PBBFAC

## 2024-11-27 PROCEDURE — 87591 N.GONORRHOEAE DNA AMP PROB: CPT

## 2024-11-27 RX ORDER — LIDOCAINE HYDROCHLORIDE 10 MG/ML
2.1 INJECTION, SOLUTION EPIDURAL; INFILTRATION; INTRACAUDAL; PERINEURAL
Status: COMPLETED | OUTPATIENT
Start: 2024-11-27 | End: 2024-11-27

## 2024-11-27 RX ORDER — AZITHROMYCIN 1 G/1
1 POWDER, FOR SUSPENSION ORAL ONCE
Qty: 1 PACKET | Refills: 0 | Status: SHIPPED | OUTPATIENT
Start: 2024-11-27 | End: 2024-11-27

## 2024-11-27 RX ORDER — CEFTRIAXONE 500 MG/1
500 INJECTION, POWDER, FOR SOLUTION INTRAMUSCULAR; INTRAVENOUS
Status: COMPLETED | OUTPATIENT
Start: 2024-11-27 | End: 2024-11-27

## 2024-11-27 RX ADMIN — CEFTRIAXONE SODIUM 500 MG: 500 INJECTION, POWDER, FOR SOLUTION INTRAMUSCULAR; INTRAVENOUS at 09:11

## 2024-11-27 RX ADMIN — LIDOCAINE HYDROCHLORIDE 21 MG: 10 INJECTION, SOLUTION EPIDURAL; INFILTRATION; INTRACAUDAL; PERINEURAL at 09:11

## 2024-11-27 NOTE — PROGRESS NOTES
[facility] Clinic Progress Note    ID:  Roberto Hall   MRN:  [id number]     11/27/2024    Chief Complaint:    Chief Complaint   Patient presents with    STD F/U      History of Present Illness:  Roberto Hall is a 19 y.o. male who presents to [facility] clinic for STD follow up. Patient was seen previously x2. Last seen on 11/18/24 where STD workup was done after his gf was diagnosed with chlamydia. Patient was seen in clinic on 11/15 and treated for chlamydia but symptoms persisted and presented again on 11/18 where he was prescribed another course of abx with Doxycycline.     Patient now presents because he reports still having some symptoms. His gf has been treated and has no symptoms, but he is still reporting some intermittent burning along with cloudy penile discharge he notices in the morning. Reports that his discharge is very minimal and only present in the morning but is still worried. Reports no recent sexual contact with his gf and no sexual relations with anyone outside of his gf.    Medication List with Changes/Refills   New Medications    AZITHROMYCIN (ZITHROMAX) 1 GRAM PACK    Take 1 g by mouth once. for 1 dose   Current Medications    POLYETHYLENE GLYCOL (GLYCOLAX) 17 GRAM PWPK    Take 17 g by mouth daily as needed (constipation).    SALICYLIC ACID 40 % PTMD    Apply 0.5 application  topically once daily.       Review of Systems:  Pertinent positives and negatives as mentioned in HPI    Objective:    Vitals:    11/27/24 0901   BP: 130/87   Pulse: 78   Temp: 97.7 °F (36.5 °C)       Physical Exam  Constitutional:       General: He is not in acute distress.     Appearance: Normal appearance. He is not ill-appearing.   HENT:      Head: Normocephalic and atraumatic.   Eyes:      General: No scleral icterus.  Genitourinary:     Penis: Normal.       Testes: Normal.      Comments: No penile discharge on examination, no erythema or edema  Musculoskeletal:         General: Normal range of motion.      Cervical back:  Normal range of motion and neck supple.   Skin:     General: Skin is warm.      Coloration: Skin is not jaundiced.   Neurological:      Mental Status: He is alert.             Assessment/Plan:  Roberto was seen today for std f/u .    Diagnoses and all orders for this visit:    Dysuria  -     Urinalysis, Reflex to Urine Culture; Future  -     Chlamydia/GC, PCR  -     Urinalysis, Reflex to Urine Culture  -     cefTRIAXone injection 500 mg  -     LIDOcaine (PF) 10 mg/ml (1%) injection 21 mg    Other orders  -     azithromycin (ZITHROMAX) 1 gram Pack; Take 1 g by mouth once. for 1 dose        Health Maintenance   Topic Date Due    TETANUS VACCINE  04/18/2033    Hepatitis C Screening  Completed    Lipid Panel  Completed    HPV Vaccines  Completed     20 yo M presenting for STD follow up with recurrent symptoms    - Patient treated previously with x2 courses of doxycycline for presumed chlamydia given gf was diagnosed with chlamydia  - Will administer Rocephin 500mg shot mixed with 2.1ml of lidocaine  - Azithromycin 1g once sent to pharmacy on file  - UA and reflex to culture along with GC/Chlamydia swab sent off today  - Discussed with patient to call clinic if symptoms persists and may consider urology referral in the future    No follow-ups on file.    Future Appointments   Date Time Provider Department Center   3/11/2025  8:40 AM Giorgi Dong MD [facility]     Tam Campos MD  [facility], -[facility]

## 2024-11-28 ENCOUNTER — TELEPHONE (OUTPATIENT)
Dept: EMERGENCY MEDICINE | Facility: HOSPITAL | Age: 19
End: 2024-11-28
Payer: MEDICAID

## 2024-11-28 NOTE — TELEPHONE ENCOUNTER
Called patient at phone number in chart. Patient identity verified with . Discussed recent test results including chlamydia swab. Patient received Rocephin in clinic and azithromycin 1g po tablet. Already feeling better and reports will keep an eye out for any recurring symptoms. Discussed importance of refraining of sexual intercourse until partner's test of cure comes back to avoid reinfection.    Tam Campos MD  [facility] Family [facility] HO-2

## 2024-12-02 DIAGNOSIS — Z20.2 CHLAMYDIA CONTACT, TREATED: Primary | ICD-10-CM

## 2024-12-03 ENCOUNTER — TELEPHONE (OUTPATIENT)
Dept: FAMILY MEDICINE | Facility: CLINIC | Age: 19
End: 2024-12-03
Payer: MEDICAID

## 2024-12-03 NOTE — TELEPHONE ENCOUNTER
Attempted to call patient at phone number in chart to discuss recent urology referral. Recurrent chlamydia symptoms is not managed by urology and so referral will be cancelled per urology. Patient may benefit from ID referral if symptoms continues to be recurrent. Will attempt to call patient later today to discuss.    12:00 PM: Called patient back. Patient answered and identity verified via . Discussed urology referral as above. Patient reports urinary symptoms and discharge are clearing up and he has not really noticed any discharge. Discussed to continue monitoring if he gets recurrent symptoms and can consider ID referral in the future by PCP if symptoms become recurrent. Patient amenable to plan.      Tam Campos MD  [facility] Family Medicine HO-2

## 2024-12-05 ENCOUNTER — HOSPITAL ENCOUNTER (EMERGENCY)
Facility: HOSPITAL | Age: 19
Discharge: HOME | End: 2024-12-05
Attending: INTERNAL MEDICINE
Payer: MEDICAID

## 2024-12-05 VITALS
OXYGEN SATURATION: 100 % | RESPIRATION RATE: 16 BRPM | BODY MASS INDEX: 19.08 KG/M2 | WEIGHT: 140.88 LBS | TEMPERATURE: 98 F | DIASTOLIC BLOOD PRESSURE: 96 MMHG | HEIGHT: 72 IN | HEART RATE: 69 BPM | SYSTOLIC BLOOD PRESSURE: 142 MMHG

## 2024-12-05 DIAGNOSIS — R07.9 CHEST PAIN: ICD-10-CM

## 2024-12-05 DIAGNOSIS — R03.0 WHITE COAT SYNDROME WITH HIGH BLOOD PRESSURE WITHOUT HYPERTENSION: ICD-10-CM

## 2024-12-05 DIAGNOSIS — K21.9 GASTROESOPHAGEAL REFLUX DISEASE, UNSPECIFIED WHETHER ESOPHAGITIS PRESENT: ICD-10-CM

## 2024-12-05 DIAGNOSIS — R07.89 ATYPICAL CHEST PAIN: Primary | ICD-10-CM

## 2024-12-05 LAB
ALBUMIN SERPL-MCNC: 4.6 G/DL (ref 3.5–5)
ALBUMIN/GLOB SERPL: 1.7 RATIO (ref 1.1–2)
ALP SERPL-CCNC: 51 UNIT/L (ref 40–150)
ALT SERPL-CCNC: 15 UNIT/L (ref 0–55)
ANION GAP SERPL CALC-SCNC: 12 MEQ/L
AST SERPL-CCNC: 13 UNIT/L (ref 5–34)
BACTERIA #/AREA URNS AUTO: ABNORMAL /HPF
BILIRUB SERPL-MCNC: 1.5 MG/DL
BILIRUB UR QL STRIP.AUTO: NEGATIVE
BNP BLD-MCNC: <10 PG/ML
BUN SERPL-MCNC: 11.9 MG/DL (ref 8.9–20.6)
CALCIUM SERPL-MCNC: 9.4 MG/DL (ref 8.4–10.2)
CHLORIDE SERPL-SCNC: 103 MMOL/L (ref 98–107)
CLARITY UR: CLEAR
CO2 SERPL-SCNC: 27 MMOL/L (ref 22–29)
COLOR UR AUTO: COLORLESS
CREAT SERPL-MCNC: 0.9 MG/DL (ref 0.72–1.25)
CREAT/UREA NIT SERPL: 13
D DIMER PPP IA.FEU-MCNC: <0.27 UG/ML FEU (ref 0–0.5)
GFR SERPLBLD CREATININE-BSD FMLA CKD-EPI: >60 ML/MIN/1.73/M2
GLOBULIN SER-MCNC: 2.7 GM/DL (ref 2.4–3.5)
GLUCOSE SERPL-MCNC: 102 MG/DL (ref 74–100)
GLUCOSE UR QL STRIP: NORMAL
HGB UR QL STRIP: NEGATIVE
HYALINE CASTS #/AREA URNS LPF: ABNORMAL /LPF
KETONES UR QL STRIP: NEGATIVE
LEUKOCYTE ESTERASE UR QL STRIP: NEGATIVE
NITRITE UR QL STRIP: NEGATIVE
PH UR STRIP: 6.5 [PH]
POTASSIUM SERPL-SCNC: 3.3 MMOL/L (ref 3.5–5.1)
PROT SERPL-MCNC: 7.3 GM/DL (ref 6.4–8.3)
PROT UR QL STRIP: NEGATIVE
RBC #/AREA URNS AUTO: ABNORMAL /HPF
SODIUM SERPL-SCNC: 142 MMOL/L (ref 136–145)
SP GR UR STRIP.AUTO: <1.005 (ref 1–1.03)
SQUAMOUS #/AREA URNS LPF: ABNORMAL /HPF
TROPONIN I SERPL-MCNC: <0.01 NG/ML (ref 0–0.04)
UROBILINOGEN UR STRIP-ACNC: NORMAL
WBC #/AREA URNS AUTO: ABNORMAL /HPF

## 2024-12-05 PROCEDURE — 63600175 PHARM REV CODE 636 W HCPCS: Performed by: INTERNAL MEDICINE

## 2024-12-05 PROCEDURE — 84484 ASSAY OF TROPONIN QUANT: CPT | Performed by: INTERNAL MEDICINE

## 2024-12-05 PROCEDURE — 83880 ASSAY OF NATRIURETIC PEPTIDE: CPT | Performed by: INTERNAL MEDICINE

## 2024-12-05 PROCEDURE — 99285 EMERGENCY DEPT VISIT HI MDM: CPT | Mod: 25

## 2024-12-05 PROCEDURE — 81001 URINALYSIS AUTO W/SCOPE: CPT | Performed by: INTERNAL MEDICINE

## 2024-12-05 PROCEDURE — 96372 THER/PROPH/DIAG INJ SC/IM: CPT | Performed by: INTERNAL MEDICINE

## 2024-12-05 PROCEDURE — 93005 ELECTROCARDIOGRAM TRACING: CPT

## 2024-12-05 PROCEDURE — 85379 FIBRIN DEGRADATION QUANT: CPT | Performed by: INTERNAL MEDICINE

## 2024-12-05 PROCEDURE — 80053 COMPREHEN METABOLIC PANEL: CPT | Performed by: INTERNAL MEDICINE

## 2024-12-05 RX ORDER — FAMOTIDINE 20 MG/1
20 TABLET, FILM COATED ORAL 2 TIMES DAILY
Qty: 28 TABLET | Refills: 0 | Status: SHIPPED | OUTPATIENT
Start: 2024-12-05 | End: 2024-12-09

## 2024-12-05 RX ORDER — KETOROLAC TROMETHAMINE 30 MG/ML
30 INJECTION, SOLUTION INTRAMUSCULAR; INTRAVENOUS
Status: COMPLETED | OUTPATIENT
Start: 2024-12-05 | End: 2024-12-05

## 2024-12-05 RX ORDER — DICLOFENAC SODIUM 50 MG/1
50 TABLET, DELAYED RELEASE ORAL 2 TIMES DAILY PRN
Qty: 15 TABLET | Refills: 0 | Status: SHIPPED | OUTPATIENT
Start: 2024-12-05 | End: 2024-12-09

## 2024-12-05 RX ADMIN — KETOROLAC TROMETHAMINE 30 MG: 30 INJECTION, SOLUTION INTRAMUSCULAR; INTRAVENOUS at 02:12

## 2024-12-05 NOTE — ED PROVIDER NOTES
"Encounter Date: 12/5/2024       History     Chief Complaint   Patient presents with    Chest Pain     Pt states stated to feel "weird" 30 minutes PTA then began to have chest pain 15 minutes later. Pt states blood pressure was about 166/101 at home. Pt states currently having left sided chest pain      Presents with chest pain. States started around afternoon, was resting in bed when it started. Pain is achy, mild, left anterior chest, radiating to flank. Denies injury, SOB, vomiting, rash or urinary symptoms. No cough or fever. Denies medical problems, but states his BP is usually high    The history is provided by the patient.     Review of patient's allergies indicates:   Allergen Reactions    Amoxicillin      Past Medical History:   Diagnosis Date    Bilateral headaches 1/27/2023     No past surgical history on file.  Family History   Problem Relation Name Age of Onset    Hypertension Father       Social History     Tobacco Use    Smoking status: Every Day     Types: Vaping with nicotine    Smokeless tobacco: Never   Substance Use Topics    Alcohol use: Not Currently     Comment: occassionally    Drug use: Never     Review of Systems   Cardiovascular:  Positive for chest pain.   Genitourinary:  Positive for flank pain.       Physical Exam     Initial Vitals [12/05/24 0138]   BP Pulse Resp Temp SpO2   (!) 160/99 74 18 97.6 °F (36.4 °C) 100 %      MAP       --         Physical Exam    Nursing note and vitals reviewed.  Constitutional: He appears well-developed and well-nourished.   HENT:   Head: Normocephalic and atraumatic.   Eyes: Conjunctivae and EOM are normal. Pupils are equal, round, and reactive to light.   Neck: Neck supple. No thyromegaly present. No tracheal deviation present. No JVD present.   Normal range of motion.  Cardiovascular:  Normal rate, regular rhythm, normal heart sounds and intact distal pulses.           Pulmonary/Chest: Breath sounds normal. No respiratory distress.   Abdominal: Abdomen " is soft. Bowel sounds are normal. He exhibits no distension. There is no abdominal tenderness. There is no rebound and no guarding.   Musculoskeletal:         General: No edema. Normal range of motion.      Cervical back: Normal range of motion and neck supple.     Neurological: He is alert and oriented to person, place, and time. He has normal strength. GCS score is 15. GCS eye subscore is 4. GCS verbal subscore is 5. GCS motor subscore is 6.   Skin: Skin is warm and dry. No rash noted.   Psychiatric: His behavior is normal. Thought content normal.         ED Course   Procedures  Labs Reviewed   COMPREHENSIVE METABOLIC PANEL - Abnormal       Result Value    Sodium 142      Potassium 3.3 (*)     Chloride 103      CO2 27      Glucose 102 (*)     Blood Urea Nitrogen 11.9      Creatinine 0.90      Calcium 9.4      Protein Total 7.3      Albumin 4.6      Globulin 2.7      Albumin/Globulin Ratio 1.7      Bilirubin Total 1.5      ALP 51      ALT 15      AST 13      eGFR >60      Anion Gap 12.0      BUN/Creatinine Ratio 13     URINALYSIS, REFLEX TO URINE CULTURE - Abnormal    Color, UA Colorless (*)     Appearance, UA Clear      Specific Gravity, UA <1.005 (*)     pH, UA 6.5      Protein, UA Negative      Glucose, UA Normal      Ketones, UA Negative      Blood, UA Negative      Bilirubin, UA Negative      Urobilinogen, UA Normal      Nitrites, UA Negative      Leukocyte Esterase, UA Negative      RBC, UA None Seen      WBC, UA None Seen      Bacteria, UA None Seen      Squamous Epithelial Cells, UA None Seen      Hyaline Casts, UA None Seen     TROPONIN I - Normal    Troponin-I <0.010     B-TYPE NATRIURETIC PEPTIDE - Normal    Natriuretic Peptide <10.0     D DIMER, QUANTITATIVE - Normal    D-Dimer <0.27       EKG Readings: (Independently Interpreted)   Initial Reading: No STEMI. Rhythm: Normal Sinus Rhythm. Heart Rate: 68. Ectopy: No Ectopy. Conduction: Normal. ST Segments: Normal ST Segments. T Waves: Normal. Clinical  Impression: Normal Sinus Rhythm       Imaging Results              X-Ray Chest PA And Lateral (In process)                      Medications   ketorolac injection 30 mg (30 mg Intramuscular Given 12/5/24 0241)     Medical Decision Making  Amount and/or Complexity of Data Reviewed  Labs: ordered. Decision-making details documented in ED Course.  Radiology: ordered and independent interpretation performed. Decision-making details documented in ED Course.  ECG/medicine tests: ordered and independent interpretation performed. Decision-making details documented in ED Course.      Additional MDM:   Differential Diagnosis:   Miocardial infarction, pneumothorax, aortic dissection, pulmonary emboli, pneumonia, among others                                      Clinical Impression:  Final diagnoses:  [R07.9] Chest pain  [R07.89] Atypical chest pain (Primary)  [R03.0] White coat syndrome with high blood pressure without hypertension  [K21.9] Gastroesophageal reflux disease, unspecified whether esophagitis present          ED Disposition Condition    Discharge Stable          ED Prescriptions       Medication Sig Dispense Start Date End Date Auth. Provider    famotidine (PEPCID) 20 MG tablet Take 1 tablet (20 mg total) by mouth 2 (two) times daily. for 14 days 28 tablet 12/5/2024 12/19/2024 Chris Hi MD    diclofenac (VOLTAREN) 50 MG EC tablet Take 1 tablet (50 mg total) by mouth 2 (two) times daily as needed (Pain). 15 tablet 12/5/2024 -- Chris Hi MD          Follow-up Information       Follow up With Specialties Details Why Contact Info    Giorgi Dong MD Family Medicine In 2 weeks  [address] [facility] [address]  [phone]      [facility] Emergency Medicine  If symptoms worsen [address] [facility] [facility] [id number] [phone]             Chris Hi MD  12/05/24 [id number]

## 2024-12-06 LAB
OHS QRS DURATION: 92 MS
OHS QTC CALCULATION: 404 MS

## 2024-12-07 ENCOUNTER — TELEPHONE (OUTPATIENT)
Dept: FAMILY MEDICINE | Facility: CLINIC | Age: 19
End: 2024-12-07
Payer: MEDICAID

## 2024-12-07 NOTE — TELEPHONE ENCOUNTER
Patient left message for a call back. Called x3 with no answer at different times of day. Will attempt another day.    Tam Campos MD  [facility] Family Medicine HO-2

## 2024-12-09 ENCOUNTER — OFFICE VISIT (OUTPATIENT)
Dept: FAMILY MEDICINE | Facility: CLINIC | Age: 19
End: 2024-12-09
Payer: MEDICAID

## 2024-12-09 VITALS
DIASTOLIC BLOOD PRESSURE: 80 MMHG | HEART RATE: 80 BPM | SYSTOLIC BLOOD PRESSURE: 125 MMHG | TEMPERATURE: 98 F | HEIGHT: 72 IN | WEIGHT: 138.19 LBS | RESPIRATION RATE: 18 BRPM | OXYGEN SATURATION: 100 % | BODY MASS INDEX: 18.72 KG/M2

## 2024-12-09 DIAGNOSIS — Z11.3 SCREEN FOR STD (SEXUALLY TRANSMITTED DISEASE): Primary | ICD-10-CM

## 2024-12-09 LAB
BACTERIA #/AREA URNS AUTO: ABNORMAL /HPF
BILIRUB UR QL STRIP.AUTO: NEGATIVE
C TRACH DNA SPEC QL NAA+PROBE: NOT DETECTED
CLARITY UR: CLEAR
COLOR UR AUTO: YELLOW
GLUCOSE UR QL STRIP: NORMAL
HGB UR QL STRIP: NEGATIVE
HYALINE CASTS #/AREA URNS LPF: ABNORMAL /LPF
KETONES UR QL STRIP: NEGATIVE
LEUKOCYTE ESTERASE UR QL STRIP: NEGATIVE
MUCOUS THREADS URNS QL MICRO: ABNORMAL /LPF
N GONORRHOEA DNA SPEC QL NAA+PROBE: NOT DETECTED
NITRITE UR QL STRIP: NEGATIVE
PH UR STRIP: 6 [PH]
PROT UR QL STRIP: NEGATIVE
RBC #/AREA URNS AUTO: ABNORMAL /HPF
SOURCE (OHS): NORMAL
SP GR UR STRIP.AUTO: 1.02 (ref 1–1.03)
SQUAMOUS #/AREA URNS LPF: ABNORMAL /HPF
UROBILINOGEN UR STRIP-ACNC: NORMAL
WBC #/AREA URNS AUTO: ABNORMAL /HPF

## 2024-12-09 PROCEDURE — 87491 CHLMYD TRACH DNA AMP PROBE: CPT

## 2024-12-09 PROCEDURE — 81001 URINALYSIS AUTO W/SCOPE: CPT

## 2024-12-09 PROCEDURE — 99213 OFFICE O/P EST LOW 20 MIN: CPT | Mod: PBBFAC

## 2024-12-09 NOTE — PROGRESS NOTES
[facility] Clinic Note  12/09/2024   CHIEF COMPLAINT:  Chief Complaint   Patient presents with    Exposure to STD     Patient would like STD check girlfriend was positive for Chlamydia the beginning of November.       HISTORY OF  PRESENT ILLNESS:  Roberto Hall is a 19 y.o. male w/PMHx of chlamydia, who presents to clinic today for STD testing.     Complaints:  1) STD  Per chart review patient was seen via virtual visit on 10/29/2024 which patient was initially treated with 7 days of doxycycline after seeing that his significant other was recently diagnosed with chlamydia   Pt was follow up in clinic on 11/15/24 after intercourse with his gf which chlamydia testing at that time was positive.  He was given an additional 7 days of doxycycline to complete for treatment of chlamydia during that clinic visit.  He was seen again on 11/18/24 for further STD testing which HIV/Hep panel/Syphilis was ordered that were all negative  Pt was seen in clinic again on 11/27/24 with persistent symptoms of dysuria, which he was given Rocephin 500 mg IM once and Azithromycin 1 g to take once  Since completed multiple rounds of antibiotics in the setting of chlamydia patient states that he has had intermittent penile discharge worse in the morning along with burning with urination at the end of urination  Patient states that his girlfriend was treated with chlamydia as well with repeat testing of her being persistently negative of chlamydia or gonorrhea as of 1 or 2 weeks ago  Patient states he is still currently sexually active with his girlfriend but using barrier use persistently since being recently diagnosed with chlamydia due to possible fear of reinfecting his girlfriend  Otherwise denies any flank pain, fevers, chills, general malaise, muscle pain        REVIEW OF SYSTEMS:  See HPI      Past Medical History:   Diagnosis Date    Bilateral headaches 1/27/2023    History reviewed. No pertinent surgical history.   Social History      Socioeconomic History    Marital status: Single   Tobacco Use    Smoking status: Every Day     Types: Vaping with nicotine    Smokeless tobacco: Never   Substance and Sexual Activity    Alcohol use: Not Currently     Comment: occassionally    Drug use: Yes    Sexual activity: Yes     Social Drivers of Health     Financial Resource Strain: Low Risk  (11/15/2024)    Overall Financial Resource Strain (CARDIA)     Difficulty of Paying Living Expenses: Not hard at all   Food Insecurity: No Food Insecurity (11/15/2024)    Hunger Vital Sign     Worried About Running Out of Food in the Last Year: Never true     Ran Out of Food in the Last Year: Never true   Physical Activity: Sufficiently Active (11/15/2024)    Exercise Vital Sign     Days of Exercise per Week: 6 days     Minutes of Exercise per Session: 150+ min   Stress: No Stress Concern Present (11/15/2024)    [employer] - Occupational Stress Questionnaire     Feeling of Stress : Not at all   Housing Stability: Unknown (11/15/2024)    Housing Stability Vital Sign     Unable to Pay for Housing in the Last Year: No         Current Outpatient Medications:     diclofenac (VOLTAREN) 50 MG EC tablet, Take 1 tablet (50 mg total) by mouth 2 (two) times daily as needed (Pain)., Disp: 15 tablet, Rfl: 0    famotidine (PEPCID) 20 MG tablet, Take 1 tablet (20 mg total) by mouth 2 (two) times daily. for 14 days, Disp: 28 tablet, Rfl: 0       PHYSICAL EXAMINATION:  Blood pressure 125/80, pulse 80, temperature 97.7 °F (36.5 °C), temperature source Oral, resp. rate 18, height 6' (1.829 m), weight 62.7 kg (138 lb 3.2 oz), SpO2 100%.    General:  VSS. No acute distress.   :  No inguinal lymphadenopathy present, no rash/lesions present in the inguinal/genital area as well as penile shaft/head of the penis; no prostate tenderness, circumcised         ASSESSMENT/PLAN:    1) STD testing  Ordered repeat gonorrhea/chlamydia PCR testing with swab of the  urethra   Ordered UA for possible underlying UTI due to patient having symptoms of dysuria  Will monitor for results and treat if necessary      - return to clinic in 1 month for follow up        Giorgi Dong MD   [facility] Family Medicine Resident  12/09/2024

## 2024-12-10 NOTE — PROGRESS NOTES
Discussed with resident at time of encounter 12-09-24.  Recent treatment - Chlamydia.  Possible re-exposure.  + dysuria.    Resident's note reviewed 12-20-24.    Plan of care appropriate; follow-up results of ordered studies.  Professional services provided in an outpatient primary care center affiliated with a teaching institution.

## 2024-12-16 ENCOUNTER — TELEPHONE (OUTPATIENT)
Dept: FAMILY MEDICINE | Facility: CLINIC | Age: 19
End: 2024-12-16
Payer: MEDICAID

## 2024-12-16 NOTE — TELEPHONE ENCOUNTER
Spoke with patient regarding chlamydia/gonorrhea and UA results which were negative/unremarkable.    Patient states that he has been having intermittent clear discharge despite negative results and some mild dysuria.  Inform patient to continue with safe practice with significant other using barrier use.  Inform patient to call clinic in case of any worsening dysuria, fever, chills, any new lesions/rashes, which patient voiced understanding.

## 2024-12-17 ENCOUNTER — OFFICE VISIT (OUTPATIENT)
Dept: FAMILY MEDICINE | Facility: CLINIC | Age: 19
End: 2024-12-17
Payer: MEDICAID

## 2024-12-17 VITALS
DIASTOLIC BLOOD PRESSURE: 74 MMHG | TEMPERATURE: 98 F | HEART RATE: 91 BPM | OXYGEN SATURATION: 99 % | WEIGHT: 140 LBS | SYSTOLIC BLOOD PRESSURE: 124 MMHG | BODY MASS INDEX: 18.96 KG/M2 | HEIGHT: 72 IN

## 2024-12-17 DIAGNOSIS — K21.9 GASTROESOPHAGEAL REFLUX DISEASE, UNSPECIFIED WHETHER ESOPHAGITIS PRESENT: Primary | ICD-10-CM

## 2024-12-17 DIAGNOSIS — R10.84 GENERALIZED ABDOMINAL PAIN: ICD-10-CM

## 2024-12-17 DIAGNOSIS — T88.7XXA MEDICATION SIDE EFFECT: ICD-10-CM

## 2024-12-17 PROCEDURE — 99214 OFFICE O/P EST MOD 30 MIN: CPT | Mod: PBBFAC

## 2024-12-17 RX ORDER — PANTOPRAZOLE SODIUM 40 MG/1
40 TABLET, DELAYED RELEASE ORAL DAILY
Qty: 30 TABLET | Refills: 0 | Status: SHIPPED | OUTPATIENT
Start: 2024-12-17 | End: 2025-01-16

## 2024-12-17 NOTE — PROGRESS NOTES
[facility] Progress Note    ID:  Roberto Hall   MRN:  [id number]     12/17/2024    Chief Complaint:    Chief Complaint   Patient presents with    C/O STOMACH PAIN     History of Present Illness:  Roberto Hall is a 19 y.o. male who presents to [facility] clinic for:     Abdominal pain  - generalized, intermittent worse at night  - 2 weeks ago, after stopping doxycycline   - Has been using Miralax d/t concern for constipation and without appreciable improvement  - does notice improvement with Gas x   - worse after eating, unsure if certain foods make it better or worse  - LBM this morning watery  - Denies fever, intolerable pain, melena/hematochezia, straining with Bms, sore throat, URI symptoms, dysuria.     No Current Medications     Review of Systems:  Pertinent positives and negatives as mentioned in HPI    Objective:    Vitals:    12/17/24 1007   BP: 124/74   Pulse: 91   Temp: 98 °F (36.7 °C)       Physical Exam  Vitals reviewed.   Constitutional:       General: He is not in acute distress.     Appearance: He is not ill-appearing.   Cardiovascular:      Rate and Rhythm: Normal rate.   Pulmonary:      Effort: No respiratory distress.   Abdominal:      General: Abdomen is flat. Bowel sounds are normal. There is no distension.      Palpations: Abdomen is soft. There is no mass.      Tenderness: There is no abdominal tenderness. There is no guarding or rebound. Negative signs include Madison's sign, Rovsing's sign, McBurney's sign and psoas sign.      Hernia: No hernia is present.   Musculoskeletal:      Right lower leg: No edema.      Left lower leg: No edema.   Skin:     General: Skin is warm and dry.      Coloration: Skin is not jaundiced or pale.   Neurological:      Mental Status: He is alert and oriented to person, place, and time.   Psychiatric:         Mood and Affect: Mood normal.         Behavior: Behavior normal.       Assessment/Plan:  Roberto was seen today for c/o stomach pain.    Diagnoses and all orders for  this visit:    GERD  Medication side effect  Generalized abdominal pain  -     suspect element of GERD in addition to medication side effect of doxy from elimination of normal bowel km  -     trial of pantoprazole (PROTONIX) 40 MG tablet daily or can trial pepcid. Do not use both together  -     avoid miralax use  -     consider mylatna/maalox  -     discussed diet for low bacteria in gut and educational handout provided   -     discussed can use probiotics and handout provided to him  -     recommended to keep food diary and bring with I'm to next apt on 01/09/2025       Roxy Rodgers MD  [facility]

## 2024-12-26 ENCOUNTER — OFFICE VISIT (OUTPATIENT)
Dept: URGENT CARE | Facility: CLINIC | Age: 19
End: 2024-12-26
Payer: MEDICAID

## 2024-12-26 VITALS
DIASTOLIC BLOOD PRESSURE: 86 MMHG | TEMPERATURE: 98 F | BODY MASS INDEX: 19.39 KG/M2 | HEIGHT: 72 IN | OXYGEN SATURATION: 99 % | SYSTOLIC BLOOD PRESSURE: 132 MMHG | RESPIRATION RATE: 18 BRPM | HEART RATE: 68 BPM | WEIGHT: 143.19 LBS

## 2024-12-26 DIAGNOSIS — E80.6 HYPERBILIRUBINEMIA: Primary | ICD-10-CM

## 2024-12-26 LAB
ALBUMIN SERPL-MCNC: 4.7 G/DL (ref 3.5–5)
ALP SERPL-CCNC: 57 UNIT/L (ref 40–150)
ALT SERPL-CCNC: 13 UNIT/L (ref 0–55)
AST SERPL-CCNC: 13 UNIT/L (ref 5–34)
BILIRUB DIRECT SERPL-MCNC: 0.4 MG/DL (ref 0–?)
BILIRUB SERPL-MCNC: 1.9 MG/DL
BILIRUBIN DIRECT+TOT PNL SERPL-MCNC: 1.5 MG/DL (ref 0–0.8)
PROT SERPL-MCNC: 7.8 GM/DL (ref 6.4–8.3)

## 2024-12-26 PROCEDURE — 36415 COLL VENOUS BLD VENIPUNCTURE: CPT

## 2024-12-26 PROCEDURE — 80076 HEPATIC FUNCTION PANEL: CPT

## 2024-12-26 PROCEDURE — 99213 OFFICE O/P EST LOW 20 MIN: CPT | Mod: PBBFAC

## 2024-12-26 PROCEDURE — 99213 OFFICE O/P EST LOW 20 MIN: CPT | Mod: S$PBB,,,

## 2024-12-26 NOTE — PROGRESS NOTES
Subjective:       Patient ID: Roberto Hall is a 19 y.o. male.    Vitals:  height is 6' (1.829 m) and weight is 65 kg (143 lb 3.2 oz). His oral temperature is 97.9 °F (36.6 °C). His blood pressure is 132/86 and his pulse is 68. His respiration is 18 and oxygen saturation is 99%.     Chief Complaint: Eye Problem (Patient states yellowing of eyes x 2 weeks.)    19-year-old male presents to clinic with complaints yellowing of his eyes that began about 2 weeks ago.  Patient states he was seen in primary care prior and that they have mentioned that he has high bilirubin levels and have been keeping an eye on his bilirubin levels.  Patient is concerned about yellowing of eyes and reports that he does have a visit with primary care on January 7th, but is requesting liver labs at today's visit.  Patient denies nausea, vomiting, abdominal pain, diarrhea, headache, yellowing of the skin, pain with inspiration, pain under left ribcage.    All other systems are negative    Chart reviewed    Objective:   Physical Exam   Constitutional: He appears well-developed.  Non-toxic appearance. He does not appear ill. No distress.   Eyes: Lids are normal.      Comments: Yellowish tinge present to bilateral eyes   Cardiovascular: Regular rhythm.   Pulmonary/Chest: Effort normal and breath sounds normal.   Abdominal: Bowel sounds are normal. He exhibits no distension. Soft. flat abdomen There is no splenomegaly or hepatomegaly. There is no abdominal tenderness.   Musculoskeletal: Normal range of motion.         General: Normal range of motion.   Skin: Skin is warm, dry and not diaphoretic.   Nursing note and vitals reviewed.      Assessment:     1. Hyperbilirubinemia            Plan:     Discussed running hepatic function panel to determine liver function and degree of hyperbilirubinemia  Discussed importance of following up with primary care provider at next appointment in January  Discussed ER precautions and when to go to the ER for  worsening of jaundice symptoms    Hyperbilirubinemia  -     Cancel: Hepatic function panel; Future; Expected date: 12/26/2024  -     Follow-up primary physician; Future  -     Hepatic function panel; Future; Expected date: 12/26/2024        Please note: This chart was completed via voice to text dictation. It may contain typographical/word recognition errors. If there are any questions, please contact the provider for final clarification.

## 2024-12-26 NOTE — PROGRESS NOTES
Please Inform patient of results.  1.  Please inform patient that bilirubin levels are slightly elevated although not critical.  Patient should inform primary care provider that testing was done in the clinic and follow back up with his appointment in January

## 2024-12-27 ENCOUNTER — TELEPHONE (OUTPATIENT)
Dept: URGENT CARE | Facility: CLINIC | Age: 19
End: 2024-12-27
Payer: MEDICAID

## 2024-12-27 NOTE — TELEPHONE ENCOUNTER
----- Message from JJ Thompson sent at 12/26/2024  5:18 PM CST -----  Please Inform patient of results.  1.  Please inform patient that bilirubin levels are slightly elevated although not critical.  Patient should inform primary care provider that testing was done in the clinic and follow back up with his appointment in January

## 2024-12-28 LAB — PATH REV: NORMAL

## 2024-12-28 NOTE — PROGRESS NOTES
Please Inform patient of results.  1.  Please inform patient that his CBC suggest hyperbilirubinemia  Patient needs to follow-up with his primary care provider as soon as he can for further monitoring and or treatment

## 2024-12-30 ENCOUNTER — OFFICE VISIT (OUTPATIENT)
Dept: FAMILY MEDICINE | Facility: CLINIC | Age: 19
End: 2024-12-30
Payer: MEDICAID

## 2024-12-30 VITALS
RESPIRATION RATE: 18 BRPM | SYSTOLIC BLOOD PRESSURE: 123 MMHG | HEIGHT: 72 IN | WEIGHT: 138.19 LBS | HEART RATE: 75 BPM | DIASTOLIC BLOOD PRESSURE: 82 MMHG | TEMPERATURE: 98 F | BODY MASS INDEX: 18.72 KG/M2 | OXYGEN SATURATION: 100 %

## 2024-12-30 DIAGNOSIS — E80.6 HYPERBILIRUBINEMIA: Primary | ICD-10-CM

## 2024-12-30 PROCEDURE — 99213 OFFICE O/P EST LOW 20 MIN: CPT | Mod: PBBFAC

## 2024-12-30 NOTE — PROGRESS NOTES
[facility] OFFICE VISIT NOTE  Roberto Hall  [id number]  12/30/2024    Chief Complaint   Patient presents with    GI Problem     The patient states that he would like an ultrasound of his stomach/gallbladder area because he is having issues. He says that his eyes are yellow, stomach pain with troubling digesting food.        Roberto Hall is a 19 y.o. male  presenting to [facility] for scleral icterus.    HPI    Hyperbilirubinemia  -urgent care visit 12/26/24 d/t yellowing of eyes. Labs at that time with hyperbilirubinemia; total bili 1.9 and indirect bili 1.5.   -per chart review, he has h/o indirect hyperbilirubinemia on labs dating back to 3 years ago. Hepatitis panel negative. No prior imaging.  -reports occasional nausea and indigestion. Denies yellowing of skin, itching, vomiting, diarrhea, or abdominal pain. No unintentional weight loss.  -denies family h/o gallbladder or liver disease      Review of Systems  As per HPI    Blood pressure 123/82, pulse 75, temperature 98 °F (36.7 °C), temperature source Oral, resp. rate 18, height 6' (1.829 m), weight 62.7 kg (138 lb 3.2 oz), SpO2 100%.   Physical Exam  Vitals and nursing note reviewed.   Constitutional:       General: He is not in acute distress.  HENT:      Head: Normocephalic and atraumatic.      Mouth/Throat:      Mouth: Mucous membranes are moist.   Eyes:      General: Scleral icterus (mild bilateral scleral icterus) present.      Pupils: Pupils are equal, round, and reactive to light.   Cardiovascular:      Rate and Rhythm: Normal rate and regular rhythm.      Heart sounds: No murmur heard.  Pulmonary:      Effort: Pulmonary effort is normal. No respiratory distress.      Breath sounds: Normal breath sounds. No wheezing, rhonchi or rales.   Abdominal:      General: Abdomen is flat. Bowel sounds are normal. There is no distension.      Palpations: Abdomen is soft. There is no hepatomegaly or splenomegaly.      Tenderness: There is no abdominal tenderness. There is  no guarding.   Skin:     General: Skin is warm.      Capillary Refill: Capillary refill takes less than 2 seconds.      Coloration: Skin is not jaundiced.   Neurological:      Mental Status: He is alert and oriented to person, place, and time.         Current Medications:   Current Outpatient Medications   Medication Sig Dispense Refill    pantoprazole (PROTONIX) 40 MG tablet Take 1 tablet (40 mg total) by mouth once daily. 30 tablet 0     No current facility-administered medications for this visit.         Assessment/Plan:    Hyperbilirubinemia  -labs reviewed; indirect hyperbilirubinemia, negative hep panel  -mild scleral icterus, no jaundice, nausea without emesis or abdominal pain  -RUQ abd U/S ordered      Orders Placed This Encounter    US Abdomen Limited       Return to clinic on 1/9/25 for PCP visit, or sooner if needed.     Alesia Scott MD  [facility] Family Medicine, HO-2

## 2025-01-03 ENCOUNTER — HOSPITAL ENCOUNTER (OUTPATIENT)
Dept: RADIOLOGY | Facility: HOSPITAL | Age: 20
Discharge: HOME | End: 2025-01-03
Payer: MEDICAID

## 2025-01-03 DIAGNOSIS — E80.6 HYPERBILIRUBINEMIA: ICD-10-CM

## 2025-01-03 PROCEDURE — 76705 ECHO EXAM OF ABDOMEN: CPT | Mod: TC

## 2025-01-09 ENCOUNTER — OFFICE VISIT (OUTPATIENT)
Dept: FAMILY MEDICINE | Facility: CLINIC | Age: 20
End: 2025-01-09
Payer: MEDICAID

## 2025-01-09 VITALS
BODY MASS INDEX: 19.07 KG/M2 | TEMPERATURE: 99 F | HEART RATE: 68 BPM | SYSTOLIC BLOOD PRESSURE: 123 MMHG | HEIGHT: 72 IN | WEIGHT: 140.81 LBS | OXYGEN SATURATION: 100 % | DIASTOLIC BLOOD PRESSURE: 80 MMHG

## 2025-01-09 DIAGNOSIS — R17 ELEVATED BILIRUBIN: Primary | ICD-10-CM

## 2025-01-09 PROBLEM — R51.9 BILATERAL HEADACHES: Status: RESOLVED | Noted: 2023-01-27 | Resolved: 2025-01-09

## 2025-01-09 PROCEDURE — 99213 OFFICE O/P EST LOW 20 MIN: CPT | Mod: PBBFAC

## 2025-01-09 NOTE — PROGRESS NOTES
[facility] Clinic Note  01/09/2025   CHIEF COMPLAINT:  No chief complaint on file.        HISTORY OF  PRESENT ILLNESS:  Roberto Hall is a 19 y.o. male who presents to clinic today for follow-up of hyperbilirubinemia.     Interval History:  1) Hyperbilirubinemia   Was initially seen in urgent care visit on 12/26/24 d/t yellowing of eyes. Labs at that time with hyperbilirubinemia; total bili 1.9 and indirect bili 1.5.   per chart review, he has h/o indirect hyperbilirubinemia on labs dating back to 3 years ago. Hepatitis panel negative. No prior imaging   Pt was previously here in clinic on 12/30/24, which an US ABD was ordered to evaluate liver/gallbladder  U/S ABD on 01/03/25 showed Trace gallbladder sludge without stones or evidence of cholecystitis   Pt presents today to discuss U/S results. He states he no longer has any yellowing of his eyes/skin  Denies abdominal pain, nausea/vomiting, skin color changes, yellowing of eyes, appetite changes, cough/sore throat, GI distress.      REVIEW OF SYSTEMS:  See HPI      PMHx:  Hx Chlamydia (11/27/24) - treated with Rocephin 500 mg IM once and Azithromycin 1 g to take once     Past Medical History:   Diagnosis Date    Bilateral headaches 1/27/2023    No past surgical history on file.   Social History     Socioeconomic History    Marital status: Single   Tobacco Use    Smoking status: Every Day     Types: Vaping with nicotine    Smokeless tobacco: Never   Substance and Sexual Activity    Alcohol use: Not Currently     Comment: occassionally    Drug use: Yes    Sexual activity: Yes     Social Drivers of Health     Financial Resource Strain: Low Risk  (11/15/2024)    Overall Financial Resource Strain (CARDIA)     Difficulty of Paying Living Expenses: Not hard at all   Food Insecurity: No Food Insecurity (11/15/2024)    Hunger Vital Sign     Worried About Running Out of Food in the Last Year: Never true     Ran Out of Food in the Last Year: Never true   Physical Activity:  Sufficiently Active (11/15/2024)    Exercise Vital Sign     Days of Exercise per Week: 6 days     Minutes of Exercise per Session: 150+ min   Stress: No Stress Concern Present (11/15/2024)    [employer] - Occupational Stress Questionnaire     Feeling of Stress : Not at all   Housing Stability: Unknown (11/15/2024)    Housing Stability Vital Sign     Unable to Pay for Housing in the Last Year: No         Current Outpatient Medications:     pantoprazole (PROTONIX) 40 MG tablet, Take 1 tablet (40 mg total) by mouth once daily., Disp: 30 tablet, Rfl: 0       PHYSICAL EXAMINATION:  Blood pressure 123/80, pulse 68, temperature 98.8 °F (37.1 °C), temperature source Oral, height 6' (1.829 m), weight 63.9 kg (140 lb 12.8 oz), SpO2 100%.    General:  VSS. No acute distress.   HEENT: no scleral icterus present  Respiratory: clear to ascultation in all lung fields  Cardiovascular:  RRR, no additional heart sounds heard.  ABD: BS +, soft, nontender  Skin: normal skin color/tone     ASSESSMENT/PLAN:    1) Elevated bilirubin   CMP on 12/05/24 - reviewed and absent hyperbilirubinemia or elevated LFTs  Informed patient about monitoring for future yellow skin/eye changes that may be correlated with elevated stress, fasting, dehydration, lack of sleep, heavy physical exertion, which pt voiced understanding       - RTC in 4-6 months per routine f/u       Giorgi Dong MD   [facility] Family Medicine Resident  01/09/2025

## 2025-01-15 ENCOUNTER — PATIENT MESSAGE (OUTPATIENT)
Dept: FAMILY MEDICINE | Facility: CLINIC | Age: 20
End: 2025-01-15
Payer: MEDICAID

## 2025-01-17 ENCOUNTER — TELEPHONE (OUTPATIENT)
Dept: FAMILY MEDICINE | Facility: CLINIC | Age: 20
End: 2025-01-17
Payer: MEDICAID

## 2025-01-17 NOTE — TELEPHONE ENCOUNTER
had conversation regarding patient having light colored urine with a lot of foam (picture uploaded by patient in media).  Patient denies any type of flank pain, fevers, chills, any new sexual encounters, dysuria, urinary changes in terms of frequency, hematuria, melena.  Patient also states that he has some abdominal cramps as well with some urinary discharge.  He states that he does not feel the same ever since he got chlamydia from his girlfriend and was subsequently treated.  He wishes for further testing.  Inform patient to make an appointment to clinic to get another urinalysis and possible the testing.  Inform patient that there is a high chance that these tests may be normal findings without any further workup being necessary but information to see if he wants to try testing again and patient is agreeable

## 2025-01-28 ENCOUNTER — OFFICE VISIT (OUTPATIENT)
Dept: FAMILY MEDICINE | Facility: CLINIC | Age: 20
End: 2025-01-28
Payer: MEDICAID

## 2025-01-28 VITALS
OXYGEN SATURATION: 100 % | WEIGHT: 140 LBS | SYSTOLIC BLOOD PRESSURE: 114 MMHG | DIASTOLIC BLOOD PRESSURE: 75 MMHG | HEART RATE: 77 BPM | RESPIRATION RATE: 20 BRPM | TEMPERATURE: 99 F | HEIGHT: 72 IN | BODY MASS INDEX: 18.96 KG/M2

## 2025-01-28 DIAGNOSIS — R10.31 RIGHT LOWER QUADRANT PAIN: ICD-10-CM

## 2025-01-28 DIAGNOSIS — R30.0 DYSURIA: Primary | ICD-10-CM

## 2025-01-28 DIAGNOSIS — R10.9 RIGHT FLANK DISCOMFORT: ICD-10-CM

## 2025-01-28 LAB
BACTERIA #/AREA URNS AUTO: ABNORMAL /HPF
BILIRUB UR QL STRIP.AUTO: NEGATIVE
C TRACH DNA SPEC QL NAA+PROBE: NOT DETECTED
CLARITY UR: CLEAR
COLOR UR AUTO: YELLOW
GLUCOSE UR QL STRIP: NORMAL
HGB UR QL STRIP: NEGATIVE
HYALINE CASTS #/AREA URNS LPF: ABNORMAL /LPF
KETONES UR QL STRIP: NEGATIVE
LEUKOCYTE ESTERASE UR QL STRIP: NEGATIVE
MUCOUS THREADS URNS QL MICRO: ABNORMAL /LPF
N GONORRHOEA DNA SPEC QL NAA+PROBE: NOT DETECTED
NITRITE UR QL STRIP: NEGATIVE
PH UR STRIP: 6.5 [PH]
PROT UR QL STRIP: NEGATIVE
RBC #/AREA URNS AUTO: ABNORMAL /HPF
SOURCE (OHS): NORMAL
SP GR UR STRIP.AUTO: 1.02 (ref 1–1.03)
SQUAMOUS #/AREA URNS LPF: ABNORMAL /HPF
UROBILINOGEN UR STRIP-ACNC: NORMAL
WBC #/AREA URNS AUTO: ABNORMAL /HPF

## 2025-01-28 PROCEDURE — 81001 URINALYSIS AUTO W/SCOPE: CPT

## 2025-01-28 PROCEDURE — 87491 CHLMYD TRACH DNA AMP PROBE: CPT

## 2025-01-28 PROCEDURE — 99213 OFFICE O/P EST LOW 20 MIN: CPT | Mod: PBBFAC

## 2025-01-28 NOTE — PROGRESS NOTES
[facility] OFFICE VISIT NOTE  Roberto Hall  [id number]  01/29/2025    Chief Complaint   Patient presents with    Back Pain     Lower back pain, onset 3 weeks    Abdominal Pain    Penis Pain     Burning upon urination         HPI  Roberto Hall is a 19 y.o. male  presenting to [facility] for follow-up of dysuria and abdominal pain. Reports pain continues. Describes burning at end of urination and BL flank pain worse on the right, wraps around from lower back to RLQ abdomen. No new symptoms from prior visits. Denies fever, vomiting, hematuria, polyuria, decreased urine output, constipation. Denies issues with ejaculation, penile pain or discharge. Abdominal pain occasionally exacerbated by food. Pt reports one sexual partner. Pt states OTC AZO helps symptoms. Multiple clinic visits since October 2024 for same concerns. Prior positive chlamydia test w/ multiple abx.   10/29/24: Pt was seen via virtual visit reporting girlfriend had tested positive for chlamydia. Gf treated. Pt also received ppx tx with 7 days of doxycycline.  11/15/24: Seen in clinic with same complaints. Negative GC/chlamydia testing. He was given an additional 7 days of doxycycline to complete for treatment of chlamydia during that clinic visit. Also tested for HIV/Hep panel/Syphilis that were all negative  11/27/24: Pt was seen in clinic again with persistent symptoms of dysuria. UA negative. Repeat GC/chlamydia testing positive for chlamydia. He was given Rocephin 500 mg IM once and Azithromycin 1 g to take once. Pt noted girlfriend has negative test of cure for chlamydia/gonorrhea.   12/9/24: Seen in clinic again with persistent symptoms. Again repeated gonorrhea/chlamydia PCR testing with swab of the urethra, which was negative. UA obtained and also negative.     Current Medications:   Current Outpatient Medications   Medication Sig Dispense Refill    pantoprazole (PROTONIX) 40 MG tablet Take 1 tablet (40 mg total) by mouth once daily. 30 tablet 0     No  current facility-administered medications for this visit.       Review of Systems   Constitutional:  Negative for fever.   HENT:  Negative for sore throat.    Respiratory:  Negative for shortness of breath.    Cardiovascular:  Negative for chest pain.   Gastrointestinal:  Positive for abdominal pain. Negative for blood in stool, constipation, nausea and vomiting.   Endocrine: Negative for polyuria.   Genitourinary:  Positive for dysuria. Negative for decreased urine volume, difficulty urinating, frequency, genital sores, hematuria, penile discharge, penile pain, penile swelling, scrotal swelling and testicular pain.   Musculoskeletal:  Positive for back pain (lower). Negative for joint swelling.   Skin:  Negative for rash.       Blood pressure 114/75, pulse 77, temperature 98.5 °F (36.9 °C), temperature source Oral, resp. rate 20, height 6' (1.829 m), weight 63.5 kg (140 lb), SpO2 100%.   Physical Exam  Vitals reviewed. Exam conducted with a chaperone present (Bao Campbell LPN).   Constitutional:       General: He is not in acute distress.     Appearance: He is normal weight. He is not ill-appearing.   Eyes:      General:         Right eye: No discharge.         Left eye: No discharge.      Conjunctiva/sclera: Conjunctivae normal.   Cardiovascular:      Rate and Rhythm: Normal rate.   Pulmonary:      Effort: Pulmonary effort is normal.   Abdominal:      General: There is no distension.      Palpations: Abdomen is soft. There is no mass.      Tenderness: There is no right CVA tenderness, left CVA tenderness or guarding.      Comments: Subjective tenderness of the RLQ   Genitourinary:     Pubic Area: No rash.       Penis: No erythema, tenderness, swelling or lesions.       Testes:         Right: Tenderness or swelling not present.         Left: Tenderness or swelling not present.   Musculoskeletal:         General: No swelling. Normal range of motion.      Cervical back: Normal range of motion.   Skin:      General: Skin is warm and dry.   Neurological:      Mental Status: He is alert and oriented to person, place, and time.   Psychiatric:         Thought Content: Thought content normal.           Assessment:   1. Dysuria    2. Right lower quadrant pain    3. Right flank discomfort        Plan:  -Multiple clinic visits since October 2024 for same concerns. 1 prior positive chlamydia test, treated, DELVIN x2 negative.   -Repeat GC/chlamydia PCR via penile swab is negative today   -UA negative   -Given continued concerns of RLQ abdominal/pelvic pain, will order a CT a/p to r/o intraabdominal or testicular abnormalities.     Orders Placed This Encounter    Chlamydia/GC, PCR    CT Abdomen Pelvis W Wo Contrast    Urinalysis, Reflex to Urine Culture       Return to clinic in 1 month for f/u, or sooner if needed.     Daisy Mendiola DO  [employer]  HO-2

## 2025-02-03 ENCOUNTER — HOSPITAL ENCOUNTER (OUTPATIENT)
Dept: RADIOLOGY | Facility: HOSPITAL | Age: 20
Discharge: HOME | End: 2025-02-03
Payer: MEDICAID

## 2025-02-03 DIAGNOSIS — R10.31 RIGHT LOWER QUADRANT PAIN: ICD-10-CM

## 2025-02-03 PROCEDURE — 25500020 PHARM REV CODE 255

## 2025-02-03 PROCEDURE — 74178 CT ABD&PLV WO CNTR FLWD CNTR: CPT | Mod: TC

## 2025-02-03 RX ADMIN — IOHEXOL 100 ML: 350 INJECTION, SOLUTION INTRAVENOUS at 10:02

## 2025-03-10 ENCOUNTER — OFFICE VISIT (OUTPATIENT)
Dept: FAMILY MEDICINE | Facility: CLINIC | Age: 20
End: 2025-03-10
Payer: MEDICAID

## 2025-03-10 VITALS
HEIGHT: 72 IN | DIASTOLIC BLOOD PRESSURE: 76 MMHG | WEIGHT: 143 LBS | SYSTOLIC BLOOD PRESSURE: 116 MMHG | HEART RATE: 74 BPM | TEMPERATURE: 98 F | BODY MASS INDEX: 19.37 KG/M2 | RESPIRATION RATE: 20 BRPM | OXYGEN SATURATION: 99 %

## 2025-03-10 DIAGNOSIS — R30.0 DYSURIA: Primary | ICD-10-CM

## 2025-03-10 LAB
BACTERIA #/AREA URNS AUTO: ABNORMAL /HPF
BILIRUB UR QL STRIP.AUTO: NEGATIVE
CLARITY UR: CLEAR
COLOR UR AUTO: ABNORMAL
GLUCOSE UR QL STRIP: NORMAL
HGB UR QL STRIP: NEGATIVE
HYALINE CASTS #/AREA URNS LPF: ABNORMAL /LPF
KETONES UR QL STRIP: NEGATIVE
LEUKOCYTE ESTERASE UR QL STRIP: NEGATIVE
NITRITE UR QL STRIP: NEGATIVE
PH UR STRIP: 7 [PH]
PROT UR QL STRIP: NEGATIVE
RBC #/AREA URNS AUTO: ABNORMAL /HPF
SP GR UR STRIP.AUTO: 1.02 (ref 1–1.03)
SQUAMOUS #/AREA URNS LPF: ABNORMAL /HPF
UROBILINOGEN UR STRIP-ACNC: NORMAL
WBC #/AREA URNS AUTO: ABNORMAL /HPF

## 2025-03-10 PROCEDURE — 81001 URINALYSIS AUTO W/SCOPE: CPT

## 2025-03-10 PROCEDURE — 99213 OFFICE O/P EST LOW 20 MIN: CPT | Mod: PBBFAC

## 2025-03-10 NOTE — PROGRESS NOTES
[facility] Clinic Note  03/10/2025   CHIEF COMPLAINT:  Chief Complaint   Patient presents with    Follow-up    Dysuria     Patient still reports having foul smelling urine       HISTORY OF  PRESENT ILLNESS:  Roberto Hall is a 19 y.o. male w/PMHx of chlamydia (treated), hyperbilirubinemia, who presents to clinic today for dysuria.     Complaints:  1) Dysuria  Patient still complaints of foul-smelling urine and burning with urination in the mornings every 48 hours on average.  Describes burning at end of urination and BL flank pain worse on the right that is relieved with mild palpation  Patient states that this has been going on ever since he was initially diagnosed with chlamydia in 10/29/2024.  Patient was successfully treated for chlamydia with negative test of reinfection 3 months afterwards  CT abdomen and pelvis with IV contrast was ordered at his previous clinic visit on 01/28/2025 that was unremarkable for any intra-abdominal abnormalities or any new processes  Repeat chlamydia and gonorrhea testing on 01/28/2025 was negative; urinalysis was also unremarkable during previous office visit as well  Denies fever, vomiting, hematuria, polyuria, decreased urine output, constipation, penile pain or discharge.     REVIEW OF SYSTEMS:  See HPI      Care team:      PMHx:  Hyperbilirubinemia   Was initially seen in urgent care visit on 12/26/24 d/t yellowing of eyes. Labs at that time with hyperbilirubinemia; total bili 1.9 and indirect bili 1.5.   per chart review, he has h/o indirect hyperbilirubinemia on labs dating back to 3 years ago. Hepatitis panel negative. No prior imaging   U/S ABD on 01/03/25 showed Trace gallbladder sludge without stones or evidence of cholecystitis   01/28/25 - CT abdomen and pelvis with IV contrast was unremarkable for any intra-abdominal abnormalities or any new processes  Hx Chlamydia (10/29/24)  See office clinic note on 12/09/24 for additional details  Multiple repeat testing for  chlamydia/GC negative    Past Medical History:   Diagnosis Date    Bilateral headaches 1/27/2023    History reviewed. No pertinent surgical history.   Social History     Socioeconomic History    Marital status: Single   Tobacco Use    Smoking status: Every Day     Types: Vaping with nicotine    Smokeless tobacco: Never   Substance and Sexual Activity    Alcohol use: Not Currently     Comment: occassionally    Drug use: Yes    Sexual activity: Yes     Social Drivers of Health     Financial Resource Strain: Low Risk  (11/15/2024)    Overall Financial Resource Strain (CARDIA)     Difficulty of Paying Living Expenses: Not hard at all   Food Insecurity: No Food Insecurity (11/15/2024)    Hunger Vital Sign     Worried About Running Out of Food in the Last Year: Never true     Ran Out of Food in the Last Year: Never true   Physical Activity: Sufficiently Active (11/15/2024)    Exercise Vital Sign     Days of Exercise per Week: 6 days     Minutes of Exercise per Session: 150+ min   Stress: No Stress Concern Present (11/15/2024)    [employer] [employer] - Occupational Stress Questionnaire     Feeling of Stress : Not at all   Housing Stability: Unknown (11/15/2024)    Housing Stability Vital Sign     Unable to Pay for Housing in the Last Year: No       Current Medications[1]   none    PHYSICAL EXAMINATION:  Blood pressure 116/76, pulse 74, temperature 98.4 °F (36.9 °C), temperature source Oral, resp. rate 20, height 6' (1.829 m), weight 64.9 kg (143 lb), SpO2 99%.    General:  VSS. No acute distress.   ABD: BS +, soft, nontender, no suprapubic pain  Musculoskeletal: absent bilateral CVA tenderness          ASSESSMENT/PLAN:    1) Dysuria  Ordered repeat UA today; plan to update patient of results if abnormal  Otherwise continue with diet and encourage patient to increase fluid intake with mostly water instead carbonated beverages.      - follow up for routine visit in 3-4 months       Giorgi Dong MD   [facility]  Family Medicine Resident  03/10/2025         [1] No current outpatient medications on file.

## 2025-03-10 NOTE — PROGRESS NOTES
Date of encounter 03-10-25.  Persistent dysuria.  Chronic hyperbilirubinemia (mild indirect).    Resident's note reviewed 03-10-25.  Suspect Gilbert's Syndrome; H/H WNL; rule out hemolytic process; check Retic.  Recommend urology evaluation; may require cystoscopy.    Professional services provided in an outpatient primary care center affiliated with a teaching institution.

## 2025-05-08 ENCOUNTER — OFFICE VISIT (OUTPATIENT)
Dept: FAMILY MEDICINE | Facility: CLINIC | Age: 20
End: 2025-05-08
Payer: MEDICAID

## 2025-05-08 VITALS
SYSTOLIC BLOOD PRESSURE: 129 MMHG | OXYGEN SATURATION: 99 % | TEMPERATURE: 98 F | HEIGHT: 72 IN | WEIGHT: 138.81 LBS | DIASTOLIC BLOOD PRESSURE: 86 MMHG | HEART RATE: 77 BPM | BODY MASS INDEX: 18.8 KG/M2

## 2025-05-08 DIAGNOSIS — K59.00 CONSTIPATION, UNSPECIFIED CONSTIPATION TYPE: ICD-10-CM

## 2025-05-08 DIAGNOSIS — R51.9 NONINTRACTABLE HEADACHE, UNSPECIFIED CHRONICITY PATTERN, UNSPECIFIED HEADACHE TYPE: ICD-10-CM

## 2025-05-08 DIAGNOSIS — E80.6 HYPERBILIRUBINEMIA: Primary | ICD-10-CM

## 2025-05-08 DIAGNOSIS — J30.2 SEASONAL ALLERGIES: ICD-10-CM

## 2025-05-08 DIAGNOSIS — Z13.1 SCREENING FOR DIABETES MELLITUS: ICD-10-CM

## 2025-05-08 PROCEDURE — 99213 OFFICE O/P EST LOW 20 MIN: CPT | Mod: PBBFAC

## 2025-05-08 RX ORDER — BUTALBITAL, ACETAMINOPHEN AND CAFFEINE 300; 40; 50 MG/1; MG/1; MG/1
CAPSULE ORAL
Qty: 15 CAPSULE | Refills: 0 | Status: SHIPPED | OUTPATIENT
Start: 2025-05-08 | End: 2025-05-08

## 2025-05-08 RX ORDER — FLUTICASONE PROPIONATE 50 MCG
1 SPRAY, SUSPENSION (ML) NASAL DAILY
Qty: 16 G | Refills: 0 | Status: SHIPPED | OUTPATIENT
Start: 2025-05-08

## 2025-05-08 RX ORDER — BUTALBITAL, ACETAMINOPHEN AND CAFFEINE 50; 325; 40 MG/1; MG/1; MG/1
1 TABLET ORAL EVERY 6 HOURS PRN
Qty: 15 TABLET | Refills: 0 | Status: SHIPPED | OUTPATIENT
Start: 2025-05-08 | End: 2025-05-09

## 2025-05-08 RX ORDER — POLYETHYLENE GLYCOL 3350 17 G/17G
17 POWDER, FOR SOLUTION ORAL 2 TIMES DAILY PRN
Qty: 72 EACH | Refills: 1 | Status: SHIPPED | OUTPATIENT
Start: 2025-05-08

## 2025-05-08 RX ORDER — BUTALBITAL, ACETAMINOPHEN AND CAFFEINE 50; 325; 40 MG/1; MG/1; MG/1
1 TABLET ORAL EVERY 4 HOURS PRN
Qty: 15 TABLET | Refills: 0 | Status: SHIPPED | OUTPATIENT
Start: 2025-05-08 | End: 2025-05-08

## 2025-05-08 NOTE — PROGRESS NOTES
"[facility] OFFICE VISIT NOTE  Roberto Hall  [id number]  05/08/2025    Chief Complaint   Patient presents with    Constipation    Headache     Headaches every morning x 1 week. Constipation for months. Thinks might be diabetic would like to discuss.        Roberto Hall is a 19 y.o. male  presenting to [facility] walk-in for multiple complaints.     Reports constipation for months. Last BM today at lunchtime.   He has BM about twice a day daily.   Stool ranges from hard to soft, majority is clumps and pellets. No straining.   Drinks 3-4 bottles (16 oz bottle) of water a day. Denies blood in stool.   Diet: fast food, home cooked meals. Vegetables not every day, but if he does, he has up to 1 serving of vegetables in a day.     Scleral icterus:  Hyperbilirubinemia, Noted 12/30/24  Reports scleral icterus for at least half a year  Denies abdominal pain, nausea, vomiting, diarrhea  Weight stable  US RUQ with trace gallbladder sludge, normal appearance of liver and ducts  CT AP 2/3/25: No acute abnormality of the abdomen and pelvis.     Headache   4 year history of headaches, pressure-like, wrapping around head like a band   Admits to sensitivity to light and sound, and states sometimes headache has pounding quality  Lasts a few hours until he takes OTC NSAID  Believes vaping worsens headache, so has recently quit  Denies nausea, vomiting  Fioricet helps, requesting refills  He is asking if maybe headaches are due to high BG, as he has been eating a lot of sweets. Denies PMH of diabetes.     Also complains of seasonal allergies, asking for medicine that may help..     Review of Systems: see HPI.    Blood pressure 129/86, pulse 77, temperature 98.1 °F (36.7 °C), temperature source Oral, height 6' 0.01" (1.829 m), weight 63 kg (138 lb 12.8 oz), SpO2 99%.   Physical Exam  General: appears well, in no acute distress   Eye: + scleral icterus   HENT: MMM, oropharynx without erythema/exudate, no nasal discharge. No TTP over TMJ " bilaterally.  Neck: supple, no lymphadenopathy.   Respiratory: clear to auscultation bilaterally, nonlabored respirations   Cardiovascular: regular rate and rhythm without murmurs or gallops, no edema in bilateral lower extremities   Gastrointestinal: soft, non-tender, non-distended, bowel sounds present   Musculoskeletal: no gross deformities observed   Integumentary: no acute rashes or skin lesions observed    Neuro: No focal lesions observed     Current Medications:   Current Medications[1]    Assessment:   1. Hyperbilirubinemia    2. Constipation, unspecified constipation type    3. Screening for diabetes mellitus    4. Nonintractable headache, unspecified chronicity pattern, unspecified headache type    5. Seasonal allergies        Plan:  - work up hyperbilirubinemia as below  - constipation: Rx for miralax sent. Recommend increase dietary fiber with more vegetable intake, supplement fiber (Metamucil, benefiber) if needed, adequate hydration, probiotics  - headache, Fioricet Rx sent. Tension type vs migraine, patient reporting symptoms of both. To complete headache diary to review next visit. Congratulated on quitting vaping.   - start Flonase  - will screen for DM     Orders Placed This Encounter    Comprehensive Metabolic Panel    Hemoglobin A1C    CBC Auto Differential    Lactate Dehydrogenase    Bilirubin, Total and Direct    Haptoglobin    polyethylene glycol (GLYCOLAX) 17 gram PwPk    butalbital-acetaminophen-caffeine -40 mg (FIORICET, ESGIC) -40 mg per tablet    fluticasone propionate (FLONASE) 50 mcg/actuation nasal spray       Return to clinic in 1 week for headache follow up with headache diary, and to go over labs, or sooner if needed.     Ewa Dowd  [facility] FM Resident          [1]   Current Outpatient Medications   Medication Sig Dispense Refill    butalbital-acetaminophen-caffeine -40 mg (FIORICET, ESGIC) -40 mg per tablet Take 1 tablet by mouth every 4 (four) hours as needed  for Pain. 15 tablet 0    fluticasone propionate (FLONASE) 50 mcg/actuation nasal spray 1 spray (50 mcg total) by Each Nostril route once daily. 16 g 0    polyethylene glycol (GLYCOLAX) 17 gram PwPk Take 17 g by mouth 2 (two) times daily as needed for Constipation. 72 each 1     No current facility-administered medications for this visit.

## 2025-05-09 ENCOUNTER — TELEPHONE (OUTPATIENT)
Dept: FAMILY MEDICINE | Facility: CLINIC | Age: 20
End: 2025-05-09
Payer: MEDICAID

## 2025-05-09 ENCOUNTER — LAB VISIT (OUTPATIENT)
Dept: LAB | Facility: HOSPITAL | Age: 20
End: 2025-05-09
Payer: MEDICAID

## 2025-05-09 DIAGNOSIS — Z13.1 SCREENING FOR DIABETES MELLITUS: ICD-10-CM

## 2025-05-09 DIAGNOSIS — E80.6 HYPERBILIRUBINEMIA: ICD-10-CM

## 2025-05-09 DIAGNOSIS — R51.9 NONINTRACTABLE HEADACHE, UNSPECIFIED CHRONICITY PATTERN, UNSPECIFIED HEADACHE TYPE: ICD-10-CM

## 2025-05-09 LAB
ALBUMIN SERPL-MCNC: 4.6 G/DL (ref 3.5–5)
ALBUMIN/GLOB SERPL: 1.4 RATIO (ref 1.1–2)
ALP SERPL-CCNC: 61 UNIT/L (ref 40–150)
ALT SERPL-CCNC: 11 UNIT/L (ref 0–55)
ANION GAP SERPL CALC-SCNC: 8 MEQ/L
AST SERPL-CCNC: 12 UNIT/L (ref 11–45)
BASOPHILS # BLD AUTO: 0.03 X10(3)/MCL
BASOPHILS NFR BLD AUTO: 0.6 %
BILIRUB DIRECT SERPL-MCNC: 0.6 MG/DL (ref 0–?)
BILIRUB SERPL-MCNC: 3.3 MG/DL
BUN SERPL-MCNC: 11.6 MG/DL (ref 8.9–20.6)
CALCIUM SERPL-MCNC: 9.4 MG/DL (ref 8.4–10.2)
CHLORIDE SERPL-SCNC: 104 MMOL/L (ref 98–107)
CO2 SERPL-SCNC: 29 MMOL/L (ref 22–29)
CREAT SERPL-MCNC: 0.93 MG/DL (ref 0.72–1.25)
CREAT/UREA NIT SERPL: 12
EOSINOPHIL # BLD AUTO: 0.03 X10(3)/MCL (ref 0–0.9)
EOSINOPHIL NFR BLD AUTO: 0.6 %
ERYTHROCYTE [DISTWIDTH] IN BLOOD BY AUTOMATED COUNT: 12 % (ref 11.5–17)
EST. AVERAGE GLUCOSE BLD GHB EST-MCNC: 93.9 MG/DL
GFR SERPLBLD CREATININE-BSD FMLA CKD-EPI: >60 ML/MIN/1.73/M2
GLOBULIN SER-MCNC: 3.4 GM/DL (ref 2.4–3.5)
GLUCOSE SERPL-MCNC: 78 MG/DL (ref 74–100)
HAPTOGLOB SERPL-MCNC: 92 MG/DL (ref 14–258)
HBA1C MFR BLD: 4.9 %
HCT VFR BLD AUTO: 48 % (ref 42–52)
HGB BLD-MCNC: 16.4 G/DL (ref 14–18)
IMM GRANULOCYTES # BLD AUTO: 0.01 X10(3)/MCL (ref 0–0.04)
IMM GRANULOCYTES NFR BLD AUTO: 0.2 %
LDH SERPL-CCNC: 116 U/L (ref 125–220)
LYMPHOCYTES # BLD AUTO: 1.59 X10(3)/MCL (ref 0.6–4.6)
LYMPHOCYTES NFR BLD AUTO: 31.1 %
MCH RBC QN AUTO: 30.4 PG (ref 27–31)
MCHC RBC AUTO-ENTMCNC: 34.2 G/DL (ref 33–36)
MCV RBC AUTO: 89.1 FL (ref 80–94)
MONOCYTES # BLD AUTO: 0.45 X10(3)/MCL (ref 0.1–1.3)
MONOCYTES NFR BLD AUTO: 8.8 %
NEUTROPHILS # BLD AUTO: 3.01 X10(3)/MCL (ref 2.1–9.2)
NEUTROPHILS NFR BLD AUTO: 58.7 %
NRBC BLD AUTO-RTO: 0 %
PLATELET # BLD AUTO: 198 X10(3)/MCL (ref 130–400)
PMV BLD AUTO: 9.9 FL (ref 7.4–10.4)
POTASSIUM SERPL-SCNC: 3.8 MMOL/L (ref 3.5–5.1)
PROT SERPL-MCNC: 8 GM/DL (ref 6.4–8.3)
RBC # BLD AUTO: 5.39 X10(6)/MCL (ref 4.7–6.1)
SODIUM SERPL-SCNC: 141 MMOL/L (ref 136–145)
WBC # BLD AUTO: 5.12 X10(3)/MCL (ref 4.5–11.5)

## 2025-05-09 PROCEDURE — 80053 COMPREHEN METABOLIC PANEL: CPT

## 2025-05-09 PROCEDURE — 85025 COMPLETE CBC W/AUTO DIFF WBC: CPT

## 2025-05-09 PROCEDURE — 83010 ASSAY OF HAPTOGLOBIN QUANT: CPT

## 2025-05-09 PROCEDURE — 83615 LACTATE (LD) (LDH) ENZYME: CPT

## 2025-05-09 PROCEDURE — 82248 BILIRUBIN DIRECT: CPT

## 2025-05-09 PROCEDURE — 83036 HEMOGLOBIN GLYCOSYLATED A1C: CPT

## 2025-05-09 PROCEDURE — 36415 COLL VENOUS BLD VENIPUNCTURE: CPT

## 2025-05-09 RX ORDER — BUTALBITAL, ACETAMINOPHEN AND CAFFEINE 50; 325; 40 MG/1; MG/1; MG/1
1 TABLET ORAL EVERY 6 HOURS PRN
Qty: 15 TABLET | Refills: 0 | Status: SHIPPED | OUTPATIENT
Start: 2025-05-09 | End: 2025-05-14

## 2025-05-09 NOTE — PROGRESS NOTES
I reviewed History, PE, A/P and chart was reviewed.  Services provided in the outpatient department of  a teaching facility, I was immediately available.  I agree with resident, care reasonable and necessary with any exceptions stated below.  Management discussed with resident at time of visit.    Pt was discharged before I could examine, staff to see pt at FU  Likely needs GI consult and further page Yee MD  [facility] Family Medicine Residency - JEAN CLAUDE Blank  [facility]

## 2025-05-09 NOTE — TELEPHONE ENCOUNTER
Verbal order for Fioricet q6h prn x15 tablets placed to [facility].

## 2025-05-09 NOTE — TELEPHONE ENCOUNTER
Dr Dong, the patient called in regards to his abnormal lab-work, he is asking that you please give him a call and also he did not receive the medication for his headaches.

## 2025-05-13 ENCOUNTER — TELEPHONE (OUTPATIENT)
Dept: FAMILY MEDICINE | Facility: CLINIC | Age: 20
End: 2025-05-13
Payer: MEDICAID

## 2025-05-13 NOTE — TELEPHONE ENCOUNTER
Spoke with patient regarding recent lab results. Patient had elevated unconjugated bilirubin which he has similar in the past without any evidence of transaminitis, Likely Gilbert's syndrome.  Inform patient that patient does not have diabetes   Patient voiced understanding and is planning on seeing me on 05/16/2025   Patient states that taking MiraLax daily has helped with his overall constipation    Giorgi Dong MD   [facility] Family Medicine Resident  05/13/2025

## 2025-05-19 ENCOUNTER — OFFICE VISIT (OUTPATIENT)
Dept: FAMILY MEDICINE | Facility: CLINIC | Age: 20
End: 2025-05-19
Payer: MEDICAID

## 2025-05-19 VITALS
DIASTOLIC BLOOD PRESSURE: 76 MMHG | OXYGEN SATURATION: 100 % | BODY MASS INDEX: 18.5 KG/M2 | TEMPERATURE: 98 F | HEIGHT: 72 IN | SYSTOLIC BLOOD PRESSURE: 118 MMHG | WEIGHT: 136.63 LBS | HEART RATE: 65 BPM

## 2025-05-19 DIAGNOSIS — E80.6 HYPERBILIRUBINEMIA: ICD-10-CM

## 2025-05-19 DIAGNOSIS — R19.5 CHANGE IN CONSISTENCY OF STOOL: Primary | ICD-10-CM

## 2025-05-19 DIAGNOSIS — G44.209 TENSION HEADACHE: ICD-10-CM

## 2025-05-19 PROCEDURE — 99214 OFFICE O/P EST MOD 30 MIN: CPT | Mod: PBBFAC

## 2025-05-19 RX ORDER — CLOTRIMAZOLE AND BETAMETHASONE DIPROPIONATE 10; .64 MG/G; MG/G
CREAM TOPICAL 2 TIMES DAILY
Qty: 15 G | Refills: 0 | Status: SHIPPED | OUTPATIENT
Start: 2025-05-19

## 2025-05-19 NOTE — PROGRESS NOTES
[facility] Clinic Note  05/19/2025   CHIEF COMPLAINT:  Chief Complaint   Patient presents with    Follow-up       HISTORY OF  PRESENT ILLNESS:   Roberto Hall is a 19 y.o. male who presents to clinic today for follow up of his hyperbilirubinemia and change in stool consistencies.     Complaints:  none    Interval History:  1) hyperbilirubinemia  Patient was previously seen in clinic on 05/08/2025   At that visit that his lab work showed elevated total and direct bilirubin; otherwise his CMP, CBC, A1c, LDH, haptoglobin, UA were unremarkable  Previous clinic visits has shown him to have elevated bilirubin level  Previous total bilirubin was 1.9 on 12/26/24; 2.0 on 01/27/2023 and on 02/24/21  Patient denies yellowing of skin, eyes, pruritus over skin    2) Changes in stool caliber/consistency  Symptom onset since 11/2024   Patient states that he is having 1-2 bowel movements daily which his stool consistency changes from soft/diarrhea to constipation/hard clumps  Patient states that they are certain foods that may trigger to have him have more diarrhea or constipation  Patient was given MiraLax in the past which did help relieve his constipation but is still having changes in stool caliber/consistency   He does admit to having some lower abdominal cramps lasting several sec that are intermittent that resolve on its own; does not know what triggers them occur  He states that his overall diet has not changed and he is currently eating whatever he wants to as he is not allergic to any particular type of food that he is aware of  He is only sexually active with his girlfriend and admits to not performing any type anal intercourse or insertion of any foreign objects in the anal canal ever  He denies appetite changes, unintentional weight loss, weight changes, fatigue, weakness, dark/bloody stool, GERD, abdominal pain, nausea, vomiting, hematemesis but admits to stool caliber changes    3) Tension Headache  Patient reports that  he has not had a headache for the past week   Only reports of taking prescribe Fioricet 1 tablet once last week for a single episode of headache, which was subsequently resolved after taking the Fioricet   Patient states that he is currently seeing a outpatient chiropractor which has helped with his headaches as well       REVIEW OF SYSTEMS:  See HPI    PMHx:  Hyperbilirubinemia   Was initially seen in urgent care visit on 12/26/24 d/t yellowing of eyes. Labs at that time with hyperbilirubinemia; total bili 1.9 and indirect bili 1.5.   per chart review, he has h/o indirect hyperbilirubinemia on labs dating back to 3 years ago. Hepatitis panel negative. No prior imaging   U/S ABD on 01/03/25 showed Trace gallbladder sludge without stones or evidence of cholecystitis   01/28/25 - CT abdomen and pelvis with IV contrast was unremarkable for any intra-abdominal abnormalities or any new processes  Hx Chlamydia (10/29/24)  See office clinic note on 12/09/24 for additional details  Multiple repeat testing for chlamydia/GC negative    Past Medical History:   Diagnosis Date    Bilateral headaches 1/27/2023    No past surgical history on file.   Social History     Socioeconomic History    Marital status: Single   Tobacco Use    Smoking status: Every Day     Types: Vaping with nicotine    Smokeless tobacco: Never   Substance and Sexual Activity    Alcohol use: Not Currently     Comment: occassionally    Drug use: Yes    Sexual activity: Yes     Social Drivers of Health     Financial Resource Strain: Low Risk  (5/8/2025)    Overall Financial Resource Strain (CARDIA)     Difficulty of Paying Living Expenses: Not hard at all   Food Insecurity: No Food Insecurity (5/8/2025)    Hunger Vital Sign     Worried About Running Out of Food in the Last Year: Never true     Ran Out of Food in the Last Year: Never true   Transportation Needs: No Transportation Needs (5/8/2025)    PRAPARE - Transportation     Lack of Transportation (Medical):  No     Lack of Transportation (Non-Medical): No   Physical Activity: Inactive (5/8/2025)    Exercise Vital Sign     Days of Exercise per Week: 0 days     Minutes of Exercise per Session: 0 min   Stress: No Stress Concern Present (5/8/2025)    [employer] [place] [employer] - Occupational Stress Questionnaire     Feeling of Stress : Not at all   Housing Stability: Low Risk  (5/8/2025)    Housing Stability Vital Sign     Unable to Pay for Housing in the Last Year: No     Number of Times Moved in the Last Year: 0     Homeless in the Last Year: No       Current Outpatient Medications   Medication Instructions    fluticasone propionate (FLONASE) 50 mcg, Each Nostril, Daily    polyethylene glycol (GLYCOLAX) 17 g, Oral, 2 times daily PRN          PHYSICAL EXAMINATION:  Blood pressure 118/76, pulse 65, temperature 98.1 °F (36.7 °C), temperature source Oral, height 6' (1.829 m), weight 62 kg (136 lb 9.6 oz), SpO2 100%.    General:  VSS. No acute distress.   HENT: no scleral icterus   Respiratory: clear to ascultation in all lung fields  Cardiovascular:  RRR, no additional heart sounds heard.  ABD: BS +, soft, nontender  :  Some mild erythema present around the anal canal that is nontender  Skin: normal skin tone       ASSESSMENT/PLAN:    1) hyperbilirubinemia  We will plan for repeat bilirubin levels in beginning of 07/2025   Ordered BMP and hepatic function panel  Ambulatory referral made to Gastroenterology; likely Gilbert's syndrome    2) GI distress; constipation  Ambulatory referral made for outpatient diagnostic colonoscopy due to consistent GI distress/change in stool caliber/constipation for the past 6 months   Prescribed Lotrisone cream to be applied twice a day for 2 weeks around the anus  Ordered multiple stool studies for home collection consisting of H pylori, fecal lactoferrin, calprotectin, Ova/parasite  Plan to treat in case of positive result  Reviewed with patient multiple different diagnosis  such as IBS, celiac disease     3) Tension headache  Continue with Fioricet as needed    - return to clinic in the last 2 weeks of July after lab work, colonoscopy       Giorgi Dong MD   \[facility]\"" Family Medicine Resident  05/19/2025

## 2025-05-20 ENCOUNTER — APPOINTMENT (OUTPATIENT)
Dept: LAB | Facility: HOSPITAL | Age: 20
End: 2025-05-20
Payer: MEDICAID

## 2025-05-20 LAB
FECAL LEUKOCYTE (OHS): NEGATIVE
H. PYLORI STOOL: NEGATIVE

## 2025-05-21 NOTE — PROGRESS NOTES
"I reviewed History, PE, A/P and medical record.  Services provided in outpatient department of a teaching hospital/facility, I was immediately available.  I agree with resident, care reasonable and necessary with any exceptions stated below.  I evaluated the patient with resident at time of visit, participated in key parts of H/P and management was discussed.    Patient does not appear jaundiced and sclera are not icteric  She has altered stool consistency where it is sometimes light brown with "flecs" in it that look like seeds, has has pictures - appears as undigested particles or sloughed material but not worms, stool alternate between brown formed logs to clumps to soft mushy light brown, no mucous, does about 2/day  Advised lacto/gluten free, ck stools, GI referral   H pylori (-)  Consider trial Celexa, lexapro, may be component of IBS, + anxiety  CT A/P in ED unrevealing  ABD US - mild sludge rec HIDA - resident messaged  TSH nl 2021 - rec repeat      Maria Guadalupe Yee MD  [facility] - [facility]    "

## 2025-05-22 ENCOUNTER — TELEPHONE (OUTPATIENT)
Dept: FAMILY MEDICINE | Facility: CLINIC | Age: 20
End: 2025-05-22
Payer: MEDICAID

## 2025-05-22 DIAGNOSIS — E80.6 HYPERBILIRUBINEMIA: Primary | ICD-10-CM

## 2025-05-22 LAB — CALPROTECTIN STL-MCNT: <50 MCG/G

## 2025-05-22 NOTE — TELEPHONE ENCOUNTER
Contacted patient via phone regarding stool studies, some of which are pending.  Informed patient that he will need additional imaging and lab work to further workup his hyperbilirubinemia with a possible differential diagnosis of Gilbert's syndrome.  GI referral has been placed at his last clinic visit in his currently pending approval along with colonoscopy.  Due to his ultrasound of his abdomen showing some biliary sludge, discussed with the patient that he will need additional studies such as a HIDA scan along with CCK.  Inform that he can get his lab work completed at the beginning of July along with the rest of his lab work consisting of BMP and hepatic function panel.  Patient voiced understanding and states that he will get every test done.    Giorgi Dong MD   [facility] Family Medicine Resident  05/22/2025

## 2025-05-28 ENCOUNTER — HOSPITAL ENCOUNTER (OUTPATIENT)
Dept: RADIOLOGY | Facility: HOSPITAL | Age: 20
Discharge: HOME | End: 2025-05-28
Payer: MEDICAID

## 2025-05-28 DIAGNOSIS — E80.6 HYPERBILIRUBINEMIA: ICD-10-CM

## 2025-05-28 PROCEDURE — A9537 TC99M MEBROFENIN: HCPCS

## 2025-05-28 PROCEDURE — 78226 HEPATOBILIARY SYSTEM IMAGING: CPT | Mod: TC

## 2025-05-28 PROCEDURE — 63600175 PHARM REV CODE 636 W HCPCS

## 2025-05-28 RX ORDER — KIT FOR THE PREPARATION OF TECHNETIUM TC 99M MEBROFENIN 45 MG/10ML
8 INJECTION, POWDER, LYOPHILIZED, FOR SOLUTION INTRAVENOUS
Status: COMPLETED | OUTPATIENT
Start: 2025-05-28 | End: 2025-05-28

## 2025-05-28 RX ORDER — SINCALIDE 5 UG/5ML
0.02 INJECTION, POWDER, LYOPHILIZED, FOR SOLUTION INTRAVENOUS ONCE
Status: COMPLETED | OUTPATIENT
Start: 2025-05-28 | End: 2025-05-28

## 2025-05-28 RX ADMIN — MEBROFENIN 7.9 MILLICURIE: 45 INJECTION, POWDER, LYOPHILIZED, FOR SOLUTION INTRAVENOUS at 11:05

## 2025-05-28 RX ADMIN — SINCALIDE 1.3 MCG: 5 INJECTION, POWDER, LYOPHILIZED, FOR SOLUTION INTRAVENOUS at 12:05

## 2025-06-09 ENCOUNTER — OFFICE VISIT (OUTPATIENT)
Dept: FAMILY MEDICINE | Facility: CLINIC | Age: 20
End: 2025-06-09
Payer: MEDICAID

## 2025-06-09 VITALS
HEIGHT: 72 IN | SYSTOLIC BLOOD PRESSURE: 114 MMHG | OXYGEN SATURATION: 98 % | TEMPERATURE: 98 F | DIASTOLIC BLOOD PRESSURE: 79 MMHG | WEIGHT: 137.38 LBS | BODY MASS INDEX: 18.61 KG/M2 | HEART RATE: 68 BPM

## 2025-06-09 DIAGNOSIS — M43.6 TORTICOLLIS: Primary | ICD-10-CM

## 2025-06-09 PROCEDURE — 99213 OFFICE O/P EST LOW 20 MIN: CPT | Mod: PBBFAC

## 2025-06-09 NOTE — PROGRESS NOTES
"[facility] Clinic Note  06/09/2025   CHIEF COMPLAINT:  Chief Complaint   Patient presents with    Neck Pain       HISTORY OF  PRESENT ILLNESS:  Roberto Hall is a 19 y.o. male who presents to clinic today for neck pain.     Complaints:  1) Posterior Neck pain  Patient states about 2-3 weeks ago, patient slept "wrong" on his bed and walked up with posterior neck pain starts from the base of the neck from the back and goes all the way to the back of his head on the right side.    Patient subsequently went to [facility] ED for posterior neck pain was diagnosed with neck torticollis; he states that it was treated with Valium which treated his headache  Today in clinic, patient no longer complains of any type of neck/headache  Has no complaints  Patient states that he is currently seeing a outpatient chiropractor which has helped with his headaches as well   Denies vision changes, headache, neck pain      REVIEW OF SYSTEMS:  See HPI        Care team:      PMHx:  Changes in stool caliber/consistency   multiple stool studies(05/20/25)  for home collection consisting of H pylori, fecal lactoferrin, calprotectin, Ova/parasite - all unremarkable  CT ABD/Pelvis (02/03/25) - unremarkable  KUB (01/03/25) - Trace gallbladder sludge without stones or evidence of cholecystitis   HIDA scan (05/28/25) - Negative hepatobiliary scintigraphy with normal gallbladder ejection fraction   Has GI appt scheduled on 02/03/26  Hyperbilirubinemia   Was initially seen in urgent care visit on 12/26/24 d/t yellowing of eyes. Labs at that time with hyperbilirubinemia; total bili 1.9 and indirect bili 1.5.   per chart review, he has h/o indirect hyperbilirubinemia on labs dating back to 3 years ago. Hepatitis panel negative. No prior imaging   U/S ABD on 01/03/25 showed Trace gallbladder sludge without stones or evidence of cholecystitis   01/28/25 - CT abdomen and pelvis with IV contrast was unremarkable for any intra-abdominal abnormalities or " any new processes  Hx Chlamydia (10/29/24)  See office clinic note on 12/09/24 for additional details  Multiple repeat testing for chlamydia/GC negative    No past surgical history on file.   Social History     Socioeconomic History    Marital status: Single   Tobacco Use    Smoking status: Every Day     Types: Vaping with nicotine    Smokeless tobacco: Never   Substance and Sexual Activity    Alcohol use: Not Currently     Comment: occassionally    Drug use: Yes    Sexual activity: Yes     Social Drivers of Health     Financial Resource Strain: Low Risk  (5/8/2025)    Overall Financial Resource Strain (CARDIA)     Difficulty of Paying Living Expenses: Not hard at all   Food Insecurity: No Food Insecurity (5/8/2025)    Hunger Vital Sign     Worried About Running Out of Food in the Last Year: Never true     Ran Out of Food in the Last Year: Never true   Transportation Needs: No Transportation Needs (5/8/2025)    PRAPARE - Transportation     Lack of Transportation (Medical): No     Lack of Transportation (Non-Medical): No   Physical Activity: Inactive (5/8/2025)    Exercise Vital Sign     Days of Exercise per Week: 0 days     Minutes of Exercise per Session: 0 min   Stress: No Stress Concern Present (5/8/2025)    [employer] [place] [employer] - Occupational Stress Questionnaire     Feeling of Stress : Not at all   Housing Stability: Low Risk  (5/8/2025)    Housing Stability Vital Sign     Unable to Pay for Housing in the Last Year: No     Number of Times Moved in the Last Year: 0     Homeless in the Last Year: No         PHYSICAL EXAMINATION:  Blood pressure 114/79, pulse 68, temperature 97.9 °F (36.6 °C), temperature source Oral, height 6' (1.829 m), weight 62.3 kg (137 lb 6.4 oz), SpO2 98%.    General:  VSS. No acute distress.   Respiratory: clear to ascultation in all lung fields  Cardiovascular:  RRR, no additional heart sounds heard.  ABD: BS +, soft, nontender  Musculoskeletal:  Full range of motion  of all extremities; no joint deformities or peripheral edema. DP 2+ pulses palpable bilaterally.  No tenderness present on the upper trapezius bilaterally; no cervical spine/thoracic spine tenderness  Neurologic:  A&O X 3.         ASSESSMENT/PLAN:    1) Neck torticollis  Resolved   Inform patient take Tylenol/Motrin as needed in case recurs  ED precautions advised in case of vision changes, severe shoulder pain  Continue to follow up with outpatient chiropractor as needed        - return to clinic in 3-6 months for routine follow up or sooner if needed         Giorgi Dong MD   [facility] Family Medicine Resident  06/09/2025

## 2025-06-25 ENCOUNTER — HOSPITAL ENCOUNTER (EMERGENCY)
Facility: HOSPITAL | Age: 20
Discharge: HOME | End: 2025-06-26
Attending: FAMILY MEDICINE
Payer: MEDICAID

## 2025-06-25 VITALS
OXYGEN SATURATION: 100 % | SYSTOLIC BLOOD PRESSURE: 133 MMHG | HEIGHT: 72 IN | RESPIRATION RATE: 20 BRPM | WEIGHT: 140 LBS | TEMPERATURE: 98 F | HEART RATE: 59 BPM | DIASTOLIC BLOOD PRESSURE: 84 MMHG | BODY MASS INDEX: 18.96 KG/M2

## 2025-06-25 DIAGNOSIS — E80.6 HYPERBILIRUBINEMIA: ICD-10-CM

## 2025-06-25 DIAGNOSIS — S16.1XXA STRAIN OF NECK MUSCLE, INITIAL ENCOUNTER: Primary | ICD-10-CM

## 2025-06-25 PROCEDURE — 99283 EMERGENCY DEPT VISIT LOW MDM: CPT

## 2025-06-26 LAB
ALBUMIN SERPL-MCNC: 4.5 G/DL (ref 3.5–5)
ALBUMIN/GLOB SERPL: 1.4 RATIO (ref 1.1–2)
ALP SERPL-CCNC: 59 UNIT/L (ref 40–150)
ALT SERPL-CCNC: 12 UNIT/L (ref 0–55)
ANION GAP SERPL CALC-SCNC: 8 MEQ/L
AST SERPL-CCNC: 13 UNIT/L (ref 11–45)
BACTERIA #/AREA URNS AUTO: ABNORMAL /HPF
BASOPHILS # BLD AUTO: 0.04 X10(3)/MCL
BASOPHILS NFR BLD AUTO: 0.5 %
BILIRUB SERPL-MCNC: 1.6 MG/DL
BILIRUB UR QL STRIP.AUTO: NEGATIVE
BUN SERPL-MCNC: 11.6 MG/DL (ref 8.9–20.6)
CALCIUM SERPL-MCNC: 9.1 MG/DL (ref 8.4–10.2)
CHLORIDE SERPL-SCNC: 106 MMOL/L (ref 98–107)
CLARITY UR: CLEAR
CO2 SERPL-SCNC: 26 MMOL/L (ref 22–29)
COLOR UR AUTO: COLORLESS
CREAT SERPL-MCNC: 0.8 MG/DL (ref 0.72–1.25)
CREAT/UREA NIT SERPL: 15
EOSINOPHIL # BLD AUTO: 0.05 X10(3)/MCL (ref 0–0.9)
EOSINOPHIL NFR BLD AUTO: 0.7 %
ERYTHROCYTE [DISTWIDTH] IN BLOOD BY AUTOMATED COUNT: 12.1 % (ref 11.5–17)
GFR SERPLBLD CREATININE-BSD FMLA CKD-EPI: >60 ML/MIN/1.73/M2
GLOBULIN SER-MCNC: 3.2 GM/DL (ref 2.4–3.5)
GLUCOSE SERPL-MCNC: 90 MG/DL (ref 74–100)
GLUCOSE UR QL STRIP: NORMAL
HCT VFR BLD AUTO: 43.9 % (ref 42–52)
HGB BLD-MCNC: 15.2 G/DL (ref 14–18)
HGB UR QL STRIP: NEGATIVE
HYALINE CASTS #/AREA URNS LPF: ABNORMAL /LPF
IMM GRANULOCYTES # BLD AUTO: 0.01 X10(3)/MCL (ref 0–0.04)
IMM GRANULOCYTES NFR BLD AUTO: 0.1 %
KETONES UR QL STRIP: NEGATIVE
LEUKOCYTE ESTERASE UR QL STRIP: NEGATIVE
LYMPHOCYTES # BLD AUTO: 2.72 X10(3)/MCL (ref 0.6–4.6)
LYMPHOCYTES NFR BLD AUTO: 36.9 %
MCH RBC QN AUTO: 30.4 PG (ref 27–31)
MCHC RBC AUTO-ENTMCNC: 34.6 G/DL (ref 33–36)
MCV RBC AUTO: 87.8 FL (ref 80–94)
MONOCYTES # BLD AUTO: 0.44 X10(3)/MCL (ref 0.1–1.3)
MONOCYTES NFR BLD AUTO: 6 %
NEUTROPHILS # BLD AUTO: 4.11 X10(3)/MCL (ref 2.1–9.2)
NEUTROPHILS NFR BLD AUTO: 55.8 %
NITRITE UR QL STRIP: NEGATIVE
NRBC BLD AUTO-RTO: 0 %
PH UR STRIP: 6.5 [PH]
PLATELET # BLD AUTO: 186 X10(3)/MCL (ref 130–400)
PMV BLD AUTO: 10.4 FL (ref 7.4–10.4)
POTASSIUM SERPL-SCNC: 3.5 MMOL/L (ref 3.5–5.1)
PROT SERPL-MCNC: 7.7 GM/DL (ref 6.4–8.3)
PROT UR QL STRIP: NEGATIVE
RBC # BLD AUTO: 5 X10(6)/MCL (ref 4.7–6.1)
RBC #/AREA URNS AUTO: ABNORMAL /HPF
SODIUM SERPL-SCNC: 140 MMOL/L (ref 136–145)
SP GR UR STRIP.AUTO: 1.01 (ref 1–1.03)
SQUAMOUS #/AREA URNS LPF: ABNORMAL /HPF
UROBILINOGEN UR STRIP-ACNC: NORMAL
WBC # BLD AUTO: 7.37 X10(3)/MCL (ref 4.5–11.5)
WBC #/AREA URNS AUTO: ABNORMAL /HPF

## 2025-06-26 PROCEDURE — 80053 COMPREHEN METABOLIC PANEL: CPT

## 2025-06-26 PROCEDURE — 85025 COMPLETE CBC W/AUTO DIFF WBC: CPT

## 2025-06-26 PROCEDURE — 81001 URINALYSIS AUTO W/SCOPE: CPT

## 2025-06-26 RX ORDER — METHOCARBAMOL 500 MG/1
500 TABLET, FILM COATED ORAL 3 TIMES DAILY PRN
Qty: 15 TABLET | Refills: 0 | Status: SHIPPED | OUTPATIENT
Start: 2025-06-26 | End: 2025-07-01

## 2025-06-26 NOTE — ED PROVIDER NOTES
Encounter Date: 6/25/2025       History     Chief Complaint   Patient presents with    Headache    Ear Fullness    Anal Itching     19-year-old male presents with complaints of intermittent neck pain onset over 1 month ago.  Reports his pain comes and goes, and can sometimes cause headaches.  Also reports intermittent dysuria as well as anal itching sensation.  Reports that he recently switched from utilizing toilet paper to wet wipes due to this.  Denies fever, body aches, vision or hearing changes, chest pain, shortness of breath, abdominal pain, nausea vomiting, diarrhea, anal pain, penile discharge, hematuria, scrotal swelling, scrotal pain.    The history is provided by the patient.     Review of patient's allergies indicates:   Allergen Reactions    Amoxicillin Hives     Past Medical History:   Diagnosis Date    Bilateral headaches 1/27/2023     No past surgical history on file.  Family History   Problem Relation Name Age of Onset    Hypertension Father       Social History[1]  Review of Systems   Constitutional:  Negative for fever.   HENT:  Negative for sore throat.    Respiratory:  Negative for shortness of breath.    Cardiovascular:  Negative for chest pain.   Gastrointestinal:  Negative for nausea.   Genitourinary:  Positive for dysuria. Negative for hematuria, scrotal swelling and testicular pain.   Musculoskeletal:  Positive for myalgias. Negative for back pain.   Skin:  Negative for rash.   Neurological:  Negative for weakness.   Hematological:  Does not bruise/bleed easily.       Physical Exam     Initial Vitals [06/25/25 2330]   BP Pulse Resp Temp SpO2   133/84 (!) 59 20 97.9 °F (36.6 °C) 100 %      MAP       --         Physical Exam    Nursing note and vitals reviewed.  Constitutional: He appears well-developed and well-nourished. He is not diaphoretic. No distress.   HENT:   Head: Normocephalic and atraumatic.   Right Ear: Hearing and external ear normal.   Left Ear: Hearing and external ear  normal.   Nose: Nose normal. Mouth/Throat: Uvula is midline. No trismus in the jaw. Normal dentition. No dental abscesses or uvula swelling. No oropharyngeal exudate or tonsillar abscesses.   Eyes: Conjunctivae and EOM are normal. Pupils are equal, round, and reactive to light.   Neck: Neck supple. There are no signs of injury. No crepitus. No Brudzinski's sign noted.   Normal range of motion.   Full passive range of motion without pain.     Cardiovascular:  Normal rate.           Pulmonary/Chest: Breath sounds normal. No respiratory distress. He has no wheezes.   Abdominal: Abdomen is soft. He exhibits no distension. There is no abdominal tenderness.   Musculoskeletal:         General: Normal range of motion.      Cervical back: Full passive range of motion without pain, normal range of motion and neck supple. Spasms (Bilateral paraspinous muscles) and tenderness (Bilateral paraspinous muscles) present. No swelling, edema, deformity, erythema, signs of trauma, lacerations, rigidity, torticollis, bony tenderness or crepitus. No pain with movement. Normal range of motion.      Comments: Full strength grossly bilateral upper extremities.     Neurological: He is alert and oriented to person, place, and time. He has normal strength. No cranial nerve deficit or sensory deficit. GCS score is 15. GCS eye subscore is 4. GCS verbal subscore is 5. GCS motor subscore is 6.   Skin: Skin is warm. Capillary refill takes less than 2 seconds. No rash noted. No erythema.   Psychiatric: He has a normal mood and affect. His behavior is normal. Thought content normal.         ED Course   Procedures  Labs Reviewed   COMPREHENSIVE METABOLIC PANEL - Abnormal       Result Value    Sodium 140      Potassium 3.5      Chloride 106      CO2 26      Glucose 90      Blood Urea Nitrogen 11.6      Creatinine 0.80      Calcium 9.1      Protein Total 7.7      Albumin 4.5      Globulin 3.2      Albumin/Globulin Ratio 1.4      Bilirubin Total 1.6 (*)      ALP 59      ALT 12      AST 13      eGFR >60      Anion Gap 8.0      BUN/Creatinine Ratio 15     URINALYSIS, REFLEX TO URINE CULTURE - Abnormal    Color, UA Colorless (*)     Appearance, UA Clear      Specific Gravity, UA 1.006      pH, UA 6.5      Protein, UA Negative      Glucose, UA Normal      Ketones, UA Negative      Blood, UA Negative      Bilirubin, UA Negative      Urobilinogen, UA Normal      Nitrites, UA Negative      Leukocyte Esterase, UA Negative      RBC, UA None Seen      WBC, UA 0-5      Bacteria, UA None Seen      Squamous Epithelial Cells, UA None Seen      Hyaline Casts, UA None Seen     CBC W/ AUTO DIFFERENTIAL    Narrative:     The following orders were created for panel order CBC auto differential.  Procedure                               Abnormality         Status                     ---------                               -----------         ------                     CBC with Differential[[id number]]                           Final result                 Please view results for these tests on the individual orders.   CBC WITH DIFFERENTIAL    WBC 7.37      RBC 5.00      Hgb 15.2      Hct 43.9      MCV 87.8      MCH 30.4      MCHC 34.6      RDW 12.1      Platelet 186      MPV 10.4      Neut % 55.8      Lymph % 36.9      Mono % 6.0      Eos % 0.7      Basophil % 0.5      Imm Grans % 0.1      Neut # 4.11      Lymph # 2.72      Mono # 0.44      Eos # 0.05      Baso # 0.04      Imm Gran # 0.01      NRBC% 0.0     HEPATIC FUNCTION PANEL          Imaging Results    None          Medications - No data to display  Medical Decision Making  Differential diagnosis:   Cervical spine muscle strain   Tension headache    Amount and/or Complexity of Data Reviewed  Labs: ordered.    Risk  Prescription drug management.               ED Course as of 06/26/25 0145   Thu Jun 26, 2025   0141 Given strict ED return precautions. I have spoken with the patient and/or caregivers. I have explained the patient's  condition, diagnoses and treatment plan based on the information available to me at this time. I have answered the patient's and/or caregiver's questions and addressed any concerns. The patient and/or caregivers have as good an understanding of the patient's diagnosis, condition and treatment plan as can be expected at this point. The vital signs have been stable. The patient's condition is stable and appropriate for discharge from the emergency department.      The patient will pursue further outpatient evaluation with the primary care physician or other designated or consulting physician as outlined in the discharge instructions. The patient and/or caregivers are agreeable to this plan of care and follow-up instructions have been explained in detail. The patient and/or caregivers have received these instructions in written format and have expressed an understanding of the discharge instructions. The patient and/or caregivers are aware that any significant change in condition or worsening of symptoms should prompt an immediate return to this or the closest emergency department or a call to 911.    [DB]      ED Course User Index  [DB] Ortega Graham PA-C                           Clinical Impression:  Final diagnoses:  [S16.1XXA] Strain of neck muscle, initial encounter (Primary)          ED Disposition Condition    Discharge Stable          ED Prescriptions       Medication Sig Dispense Start Date End Date Auth. Provider    methocarbamoL (ROBAXIN) 500 MG Tab Take 1 tablet (500 mg total) by mouth 3 (three) times daily as needed. 15 tablet 6/26/2025 7/1/2025 Ortega Graham PA-C          Follow-up Information       Follow up With Specialties Details Why Contact Info    Giorgi Dong MD Family Medicine Schedule an appointment as soon as possible for a visit   [address] [facility] [address]  [phone]      [facility] Emergency Medicine  If symptoms worsen [address]  [facility] [address]  [phone]                   [1]   Social History  Tobacco Use    Smoking status: Every Day     Types: Vaping with nicotine    Smokeless tobacco: Never   Substance Use Topics    Alcohol use: Not Currently     Comment: occassionally    Drug use: Yes        Ortega Graham, NOEMI  06/26/25 0149

## 2025-07-03 ENCOUNTER — HOSPITAL ENCOUNTER (EMERGENCY)
Facility: HOSPITAL | Age: 20
Discharge: HOME | End: 2025-07-04
Attending: INTERNAL MEDICINE
Payer: MEDICAID

## 2025-07-03 DIAGNOSIS — M25.78 OSTEOPHYTE OF CERVICAL SPINE: Primary | ICD-10-CM

## 2025-07-03 DIAGNOSIS — R51.9 GENERALIZED HEADACHE: ICD-10-CM

## 2025-07-03 DIAGNOSIS — M54.2 NECK PAIN: ICD-10-CM

## 2025-07-03 PROCEDURE — 63600175 PHARM REV CODE 636 W HCPCS: Performed by: PHYSICIAN ASSISTANT

## 2025-07-03 PROCEDURE — 99285 EMERGENCY DEPT VISIT HI MDM: CPT | Mod: 25

## 2025-07-03 PROCEDURE — 96372 THER/PROPH/DIAG INJ SC/IM: CPT | Performed by: PHYSICIAN ASSISTANT

## 2025-07-03 RX ORDER — KETOROLAC TROMETHAMINE 30 MG/ML
30 INJECTION, SOLUTION INTRAMUSCULAR; INTRAVENOUS
Status: COMPLETED | OUTPATIENT
Start: 2025-07-03 | End: 2025-07-03

## 2025-07-03 RX ORDER — PROCHLORPERAZINE EDISYLATE 5 MG/ML
10 INJECTION INTRAMUSCULAR; INTRAVENOUS
Status: COMPLETED | OUTPATIENT
Start: 2025-07-03 | End: 2025-07-03

## 2025-07-03 RX ADMIN — PROCHLORPERAZINE EDISYLATE 10 MG: 5 INJECTION INTRAMUSCULAR; INTRAVENOUS at 11:07

## 2025-07-03 RX ADMIN — KETOROLAC TROMETHAMINE 30 MG: 60 INJECTION, SOLUTION INTRAMUSCULAR at 11:07

## 2025-07-04 VITALS
WEIGHT: 140 LBS | SYSTOLIC BLOOD PRESSURE: 128 MMHG | HEART RATE: 76 BPM | OXYGEN SATURATION: 99 % | DIASTOLIC BLOOD PRESSURE: 70 MMHG | TEMPERATURE: 98 F | RESPIRATION RATE: 16 BRPM | BODY MASS INDEX: 18.96 KG/M2 | HEIGHT: 72 IN

## 2025-07-04 RX ORDER — LIDOCAINE 50 MG/G
1 PATCH TOPICAL
Status: DISCONTINUED | OUTPATIENT
Start: 2025-07-04 | End: 2025-07-04

## 2025-07-04 NOTE — ED PROVIDER NOTES
Encounter Date: 7/3/2025       History     Chief Complaint   Patient presents with    Headache     20-year-old male presents to the emergency department with complaints of chronic intermittent headache that is worsening and becoming more frequent for months.  Current headache is posterior, radiating towards bilateral ears and neck.  Patient states he has been seeing a chiropractor for his neck pain which is also getting worse.  He states he has been to the emergency departmen and his primary doctor many times however they just prescribed medications that do not work.  He rates his pain 10/10.  He reports mild blurry vision.    The history is provided by the patient. No  was used.     Review of patient's allergies indicates:   Allergen Reactions    Amoxicillin Hives     Past Medical History:   Diagnosis Date    Bilateral headaches 1/27/2023     No past surgical history on file.  Family History   Problem Relation Name Age of Onset    Hypertension Father       Social History[1]  Review of Systems   Musculoskeletal:  Positive for neck pain.   Neurological:  Positive for headaches.       Physical Exam     Initial Vitals [07/03/25 2258]   BP Pulse Resp Temp SpO2   133/87 64 20 98.9 °F (37.2 °C) 98 %      MAP       --         Physical Exam    Nursing note and vitals reviewed.  Constitutional: He appears well-developed and well-nourished.   HENT:   Head: Normocephalic and atraumatic.   Eyes: EOM are normal. Pupils are equal, round, and reactive to light.   Cardiovascular:  Normal rate, normal heart sounds and intact distal pulses.           Pulmonary/Chest: Breath sounds normal.   Abdominal: Abdomen is soft. Bowel sounds are normal.   Musculoskeletal:         General: Normal range of motion.      Cervical back: Muscular tenderness present. No spinous process tenderness.     Neurological: He is alert and oriented to person, place, and time. No cranial nerve deficit. GCS score is 15. GCS eye subscore is 4.  GCS verbal subscore is 5. GCS motor subscore is 6.   Skin: Skin is warm. Capillary refill takes less than 2 seconds.         ED Course   Procedures  Labs Reviewed - No data to display       Imaging Results              CT Head Without Contrast (Final result)  Result time 07/03/25 23:59:26      Preliminary result by Humberto Shay MD (07/03/25 23:59:26)                   Narrative:    START OF REPORT:  Technique: CT of the head was performed without intravenous contrast with axial as well as coronal and sagittal images.    Comparison: None.    Dosage Information: Automated Exposure Control was utilized 1247.01 mGy.cm.    Clinical history: Headache, chronic, new features or increased frequency.    Findings:  Hemorrhage: No acute intracranial hemorrhage is seen.  CSF spaces: The ventricles sulci and basal cisterns are within normal limits.  Brain parenchyma: There is preservation of the grey white junction throughout. No acute infarct.  Cerebellum: Unremarkable.  Vascular: Unremarkable venous sinuses.  Sella and skull base: The sella appears to be within normal limits for age.  Cerebellopontine angles: Within normal limits.  Herniation: None.  Intracranial calcifications: Incidental note is made of bilateral choroid plexus calcification. Incidental note is made of some pineal region calcification.  Calvarium: No acute linear or depressed skull fracture is seen.    Maxillofacial Structures:  Paranasal sinuses: The visualized paranasal sinuses appear clear with no mucoperiosteal thickening or air fluid levels identified.  Orbits: The orbits appear unremarkable.  Zygomatic arches: The zygomatic arches are intact and unremarkable.  Temporal bones and mastoids: The temporal bones and mastoids appear unremarkable.  TMJ: The mandibular condyles appear normally placed with respect to the mandibular fossa.      Impression:  1. No acute intracranial process identified. Details as above.                          Final result by  Hugo Sahu MD (07/03/25 23:57:28)                   Impression:      No acute intracranial findings identified.      Electronically signed by: Hugo Sahu  Date:    07/03/2025  Time:    23:57               Narrative:    EXAMINATION:  CT HEAD WITHOUT CONTRAST    CLINICAL HISTORY:  Headache, chronic, new features or increased frequency;    TECHNIQUE:  Sequential axial images were performed of the brain without contrast.    Dose product length of 1247 mGycm. Automated exposure control was utilized to minimize radiation dose.    COMPARISON:  None.    FINDINGS:  Gray-white matter differentiation is unremarkable there is no intracranial mass effect, midline shift, hydrocephalus or hemorrhage.  There is no acute extra axial fluid collection.  There is no acute depressed skull fracture.    Visualized paranasal sinuses are clear without mucosal thickening, polypoidal abnormality or air-fluid levels. Mastoid air cells aeration is optimal.                                       CT Cervical Spine Without Contrast (Final result)  Result time 07/04/25 00:00:48      Final result by Hugo Sahu MD (07/04/25 00:00:48)                   Impression:      C3-C4 left uncovertebral small spur like growth.      Electronically signed by: Hugo Sahu  Date:    07/04/2025  Time:    00:00               Narrative:    EXAMINATION:  CT CERVICAL SPINE WITHOUT CONTRAST    CLINICAL HISTORY:  Neck pain, chronic    TECHNIQUE:  Multidetector axial images were performed of the cervical spine without and.  Images were reconstructed.    Dose length product was 1247 mGycm. Approximated exposure control was utilized to minimize radiation dose.    COMPARISON:  None available.    FINDINGS:  Cervical vertebrae stature is maintained and alignment is unremarkable.  No acute fracture or malalignment identified.  There is no prevertebral soft tissue prominence.This study does not exclude the possibility of intrathecal soft tissue, ligamentous or  vascular abnormality.    There is left uncovertebral small spur like growth which causes mild narrowing of the left neural foramen at C3-C4.  Otherwise, no dominant disc bulges, protrusions or extrusions and there is no central canal stenosis or narrowings of the neural foramen.                                       Medications   prochlorperazine injection Soln 10 mg (10 mg Intramuscular Given 7/3/25 4750)   ketorolac injection 30 mg (30 mg Intramuscular Given 7/3/25 2348)     Medical Decision Making  20-year-old male presents to the emergency department with complaints of chronic intermittent headache that is worsening and becoming more frequent for months.  Current headache is posterior, radiating towards bilateral ears and neck.  Patient states he has been seeing a chiropractor for his neck pain which is also getting worse.  He states he has been to the emergency departmen and his primary doctor many times however they just prescribed medications that do not work.  He rates his pain 10/10.  He reports mild blurry vision.    DDx:  Migraines, generalized headache, tension headache, mass, stenosis, arthritis, muscle spasm    CT cervical spine: C3-C4 small spur light growth, CT head without contrast: No acute intracranial finding.  Toradol and Compazine IM given which helped with the patient's headache.  Lidocaine patch ordered however patient declined.  He will follow up with the physical therapist.    Amount and/or Complexity of Data Reviewed  Radiology: ordered. Decision-making details documented in ED Course.    Risk  Prescription drug management.               ED Course as of 07/04/25 0119   Fri Jul 04, 2025   0009 CT Cervical Spine Without Contrast  C3-C4 left uncovertebral small spur like growth [ER]   0010 CT Head Without Contrast  No acute intracranial findings identified. [ER]   0015 The patient is resting comfortably and in no acute distress.  He states that his symptoms have improved after treatment in  Emergency Department. I personally discussed his test results and treatment plan.  Gave strict ED precautions and specific conditions for return to the emergency department and importance of follow up with pcp.  Patient voices understanding and agrees to the plan discussed. All patients' questions have been answered at this time. He has remained hemodynamically stable throughout entire stay in ED and is stable for discharge home. [ER]      ED Course User Index  [ER] Afua Campbell PA                           Clinical Impression:  Final diagnoses:  [M25.78] Osteophyte of cervical spine (Primary)  [M54.2] Neck pain  [R51.9] Generalized headache          ED Disposition Condition    Discharge Stable          ED Prescriptions    None       Follow-up Information       Follow up With Specialties Details Why Contact Info    [facility] - Emergency Dept Emergency Medicine  As needed, If symptoms worsen [address] [facility] [id number] [phone]    Melvin Dash MD Family Medicine Schedule an appointment as soon as possible for a visit in 2 days  [address] [facility] [id number] [phone]                     [1]   Social History  Tobacco Use    Smoking status: Every Day     Types: Vaping with nicotine    Smokeless tobacco: Never   Substance Use Topics    Alcohol use: Not Currently     Comment: occassionally    Drug use: Yes        Afua Campbell PA  07/04/25 0119

## 2025-08-22 ENCOUNTER — OFFICE VISIT (OUTPATIENT)
Dept: FAMILY MEDICINE | Facility: CLINIC | Age: 20
End: 2025-08-22
Payer: MEDICAID

## 2025-08-22 VITALS
OXYGEN SATURATION: 99 % | RESPIRATION RATE: 20 BRPM | HEIGHT: 72 IN | DIASTOLIC BLOOD PRESSURE: 80 MMHG | HEART RATE: 78 BPM | WEIGHT: 139.81 LBS | SYSTOLIC BLOOD PRESSURE: 123 MMHG | TEMPERATURE: 98 F | BODY MASS INDEX: 18.94 KG/M2

## 2025-08-22 DIAGNOSIS — R51.9 CHRONIC NONINTRACTABLE HEADACHE, UNSPECIFIED HEADACHE TYPE: Primary | ICD-10-CM

## 2025-08-22 DIAGNOSIS — G89.29 CHRONIC NONINTRACTABLE HEADACHE, UNSPECIFIED HEADACHE TYPE: Primary | ICD-10-CM

## 2025-08-22 PROCEDURE — 99213 OFFICE O/P EST LOW 20 MIN: CPT | Mod: PBBFAC

## 2025-08-22 RX ORDER — SUMATRIPTAN SUCCINATE 50 MG/1
50 TABLET ORAL DAILY PRN
Qty: 30 TABLET | Refills: 2 | Status: SHIPPED | OUTPATIENT
Start: 2025-08-22

## 2025-08-22 RX ORDER — AMITRIPTYLINE HYDROCHLORIDE 25 MG/1
25 TABLET, FILM COATED ORAL NIGHTLY PRN
Qty: 30 TABLET | Refills: 2 | Status: SHIPPED | OUTPATIENT
Start: 2025-08-22

## 2025-08-25 ENCOUNTER — PATIENT MESSAGE (OUTPATIENT)
Dept: FAMILY MEDICINE | Facility: CLINIC | Age: 20
End: 2025-08-25
Payer: MEDICAID